# Patient Record
Sex: FEMALE | Race: WHITE | Employment: UNEMPLOYED | URBAN - METROPOLITAN AREA
[De-identification: names, ages, dates, MRNs, and addresses within clinical notes are randomized per-mention and may not be internally consistent; named-entity substitution may affect disease eponyms.]

---

## 2017-01-03 RX ORDER — ATORVASTATIN CALCIUM 10 MG/1
TABLET, FILM COATED ORAL
Qty: 90 TABLET | Refills: 2 | Status: SHIPPED | OUTPATIENT
Start: 2017-01-03 | End: 2017-12-20

## 2017-04-12 NOTE — TELEPHONE ENCOUNTER
Last Office Visit: 12-14-16 with CB for CPE   Last Rx Filled: 4-18-16 3 tubes with 1 refill   Last Labs: 12-6-16 cbc/cmp/lipid/tsh   Future Appointment: none     Per protocol to provider

## 2017-11-13 ENCOUNTER — TELEPHONE (OUTPATIENT)
Dept: INTERNAL MEDICINE CLINIC | Facility: CLINIC | Age: 62
End: 2017-11-13

## 2017-11-13 DIAGNOSIS — Z13.0 SCREENING FOR DISORDER OF BLOOD AND BLOOD-FORMING ORGANS: ICD-10-CM

## 2017-11-13 DIAGNOSIS — Z12.31 ENCOUNTER FOR SCREENING MAMMOGRAM FOR MALIGNANT NEOPLASM OF BREAST: Primary | ICD-10-CM

## 2017-11-13 DIAGNOSIS — Z00.00 ROUTINE GENERAL MEDICAL EXAMINATION AT A HEALTH CARE FACILITY: ICD-10-CM

## 2017-11-13 DIAGNOSIS — E78.00 HIGH CHOLESTEROL: ICD-10-CM

## 2017-11-13 DIAGNOSIS — K76.0 FATTY LIVER: ICD-10-CM

## 2017-11-13 NOTE — TELEPHONE ENCOUNTER
Pt will need mammo orders. No call back required will call CS in a few days to schedule.      CPE Future Appointments  Date Time Provider Maximino Loyola   12/20/2017 9:15 AM Saroj Garcias PA-C [facility]     Orders to Iliana Fuentes aware must f

## 2017-12-18 ENCOUNTER — LAB ENCOUNTER (OUTPATIENT)
Dept: LAB | Age: 62
End: 2017-12-18
Attending: PHYSICIAN ASSISTANT
Payer: OTHER GOVERNMENT

## 2017-12-18 DIAGNOSIS — Z00.00 ROUTINE GENERAL MEDICAL EXAMINATION AT A HEALTH CARE FACILITY: ICD-10-CM

## 2017-12-18 DIAGNOSIS — Z13.0 SCREENING FOR DISORDER OF BLOOD AND BLOOD-FORMING ORGANS: ICD-10-CM

## 2017-12-18 DIAGNOSIS — E78.00 HIGH CHOLESTEROL: ICD-10-CM

## 2017-12-18 PROCEDURE — 85025 COMPLETE CBC W/AUTO DIFF WBC: CPT

## 2017-12-18 PROCEDURE — 80053 COMPREHEN METABOLIC PANEL: CPT

## 2017-12-18 PROCEDURE — 80061 LIPID PANEL: CPT

## 2017-12-18 PROCEDURE — 36415 COLL VENOUS BLD VENIPUNCTURE: CPT

## 2017-12-18 PROCEDURE — 84443 ASSAY THYROID STIM HORMONE: CPT

## 2017-12-20 ENCOUNTER — OFFICE VISIT (OUTPATIENT)
Dept: INTERNAL MEDICINE CLINIC | Facility: CLINIC | Age: 62
End: 2017-12-20

## 2017-12-20 VITALS
BODY MASS INDEX: 40.43 KG/M2 | HEART RATE: 80 BPM | WEIGHT: 228.19 LBS | RESPIRATION RATE: 16 BRPM | TEMPERATURE: 98 F | SYSTOLIC BLOOD PRESSURE: 132 MMHG | HEIGHT: 63 IN | DIASTOLIC BLOOD PRESSURE: 86 MMHG

## 2017-12-20 DIAGNOSIS — Z12.39 BREAST CANCER SCREENING: ICD-10-CM

## 2017-12-20 DIAGNOSIS — Z87.898 HISTORY OF ABNORMAL MAMMOGRAM: ICD-10-CM

## 2017-12-20 DIAGNOSIS — L30.9 DERMATITIS: ICD-10-CM

## 2017-12-20 DIAGNOSIS — Z78.0 POST-MENOPAUSAL: ICD-10-CM

## 2017-12-20 DIAGNOSIS — R92.2 DENSE BREAST TISSUE ON MAMMOGRAM: ICD-10-CM

## 2017-12-20 DIAGNOSIS — Z00.00 ANNUAL PHYSICAL EXAM: Primary | ICD-10-CM

## 2017-12-20 PROCEDURE — 99396 PREV VISIT EST AGE 40-64: CPT | Performed by: PHYSICIAN ASSISTANT

## 2017-12-20 RX ORDER — ATORVASTATIN CALCIUM 10 MG/1
10 TABLET, FILM COATED ORAL
Qty: 90 TABLET | Refills: 3 | Status: SHIPPED | OUTPATIENT
Start: 2017-12-20 | End: 2019-01-27

## 2017-12-20 RX ORDER — CLOTRIMAZOLE 1 %
1 CREAM (GRAM) TOPICAL 2 TIMES DAILY
Qty: 14 G | Refills: 0 | Status: SHIPPED | OUTPATIENT
Start: 2017-12-20 | End: 2018-03-23

## 2017-12-20 NOTE — PROGRESS NOTES
Patient presents with:  Physical: Room 6. With pap. No flu       HPI:  Pt presents for annual physical.   Pt denies any new problems today. Last pap was 3 years ago, neg HPV. Pt has no h/o abnormal paps and in monogamous relationship.   Prefers to wait on lumbar      Past Medical History:   Diagnosis Date   • Acute upper respiratory infections of unspecified site    • Anxiety state, unspecified    • Closed fracture of unspecified part of femur      Past Surgical History:  No date:   2010: Gabriel Guajardo 0.625 MG/GM Vaginal Cream INSERT 0.5 GRAMS VAGINALLY THREE TIMES A WEEK Disp: 3 Tube Rfl: 2   Phytonadione (VITAMIN K OR) Take by mouth daily. Disp:  Rfl:    Aspirin 81 MG Oral Tab Take 81 mg by mouth daily.  Disp:  Rfl:    Cholecalciferol (VITAMIN D) 2000 Coordination normal.   Skin: Skin is warm and dry. + red papular rash under medial L breast  Psychiatric: Normal mood and affect.      [facility] Lab Encounter on 12/18/2017   Component Date Value Ref Range Status   • Glucose 12/18/2017 78  70 - 99 mg/dL Final   • 12/18/2017 2.48  1.30 - 6.70 x10(3) uL Final   • Lymphocyte Absolute 12/18/2017 2.19  0.90 - 4.00 x10(3) uL Final   • Monocyte Absolute 12/18/2017 0.48  0.10 - 0.60 x10(3) uL Final   • Eosinophil Absolute 12/18/2017 0.17  0.00 - 0.30 x10(3) uL Final   • Ba

## 2017-12-22 ENCOUNTER — HOSPITAL ENCOUNTER (OUTPATIENT)
Dept: MAMMOGRAPHY | Age: 62
Discharge: HOME | End: 2017-12-22
Attending: PHYSICIAN ASSISTANT
Payer: OTHER GOVERNMENT

## 2017-12-22 DIAGNOSIS — Z12.31 ENCOUNTER FOR SCREENING MAMMOGRAM FOR MALIGNANT NEOPLASM OF BREAST: ICD-10-CM

## 2017-12-22 DIAGNOSIS — Z12.39 ENCOUNTER FOR SCREENING FOR MALIGNANT NEOPLASM OF BREAST: ICD-10-CM

## 2017-12-22 PROCEDURE — 77063 BREAST TOMOSYNTHESIS BI: CPT | Performed by: PHYSICIAN ASSISTANT

## 2017-12-22 PROCEDURE — 77067 SCR MAMMO BI INCL CAD: CPT | Performed by: PHYSICIAN ASSISTANT

## 2018-01-02 ENCOUNTER — HOSPITAL ENCOUNTER (OUTPATIENT)
Dept: BONE DENSITY | Age: 63
Discharge: HOME | End: 2018-01-02
Attending: PHYSICIAN ASSISTANT
Payer: OTHER GOVERNMENT

## 2018-01-02 DIAGNOSIS — Z78.0 POST-MENOPAUSAL: ICD-10-CM

## 2018-01-02 PROCEDURE — 77080 DXA BONE DENSITY AXIAL: CPT | Performed by: PHYSICIAN ASSISTANT

## 2018-02-15 ENCOUNTER — OFFICE VISIT (OUTPATIENT)
Dept: INTERNAL MEDICINE CLINIC | Facility: CLINIC | Age: 63
End: 2018-02-15

## 2018-02-15 VITALS
BODY MASS INDEX: 38.07 KG/M2 | WEIGHT: 223 LBS | SYSTOLIC BLOOD PRESSURE: 132 MMHG | HEIGHT: 64 IN | HEART RATE: 88 BPM | RESPIRATION RATE: 16 BRPM | DIASTOLIC BLOOD PRESSURE: 80 MMHG | TEMPERATURE: 98 F

## 2018-02-15 DIAGNOSIS — N94.9 VAGINAL BURNING: ICD-10-CM

## 2018-02-15 DIAGNOSIS — B37.2 YEAST INFECTION OF THE SKIN: Primary | ICD-10-CM

## 2018-02-15 DIAGNOSIS — K64.4 EXTERNAL HEMORRHOID: ICD-10-CM

## 2018-02-15 LAB
APPEARANCE: CLEAR
BILIRUBIN: NEGATIVE
GLUCOSE (URINE DIPSTICK): NEGATIVE MG/DL
MULTISTIX LOT#: NORMAL NUMERIC
NITRITE, URINE: NEGATIVE
OCCULT BLOOD: NEGATIVE
PH, URINE: 7 (ref 4.5–8)
PROTEIN (URINE DIPSTICK): NEGATIVE MG/DL
SPECIFIC GRAVITY: 1.02 (ref 1–1.03)
URINE-COLOR: YELLOW
UROBILINOGEN,SEMI-QN: 0.2 MG/DL (ref 0–1.9)

## 2018-02-15 PROCEDURE — 81003 URINALYSIS AUTO W/O SCOPE: CPT | Performed by: PHYSICIAN ASSISTANT

## 2018-02-15 PROCEDURE — 99213 OFFICE O/P EST LOW 20 MIN: CPT | Performed by: PHYSICIAN ASSISTANT

## 2018-02-15 RX ORDER — FLUCONAZOLE 150 MG/1
150 TABLET ORAL ONCE
Qty: 1 TABLET | Refills: 0 | Status: SHIPPED | OUTPATIENT
Start: 2018-02-15 | End: 2019-01-10

## 2018-02-15 RX ORDER — CLOTRIMAZOLE AND BETAMETHASONE DIPROPIONATE 10; .64 MG/G; MG/G
CREAM TOPICAL
Qty: 15 G | Refills: 0 | Status: SHIPPED | OUTPATIENT
Start: 2018-02-15 | End: 2018-03-23

## 2018-02-15 NOTE — PROGRESS NOTES
Patient presents with:  Itching: AB RM 6, pt c/o hemorrhoids and swelling traveling to vagina X 2 weeks       HPI:  Pt presents with c/o a burning sensation all around her rectum and extending into her lower vaginal area.   She does have a h/o hemorrhoids a External Cream Apply 1 Application topically 2 (two) times daily.  Disp: 14 g Rfl: 0   Estrogens, Conjugated (PREMARIN) 0.625 MG/GM Vaginal Cream INSERT 0.5 GRAMS VAGINALLY THREE TIMES A WEEK Disp: 3 Tube Rfl: 2   Phytonadione (VITAMIN K OR) Take by mouth d answered and the patient understands the plan.

## 2018-02-19 ENCOUNTER — TELEPHONE (OUTPATIENT)
Dept: INTERNAL MEDICINE CLINIC | Facility: CLINIC | Age: 63
End: 2018-02-19

## 2018-02-19 NOTE — TELEPHONE ENCOUNTER
Patient states she has not had any recent abx use nor any blood in stool, abd pain, f/c.  Pt notified to continue with BRAT diet, hydration, could add low sugar gatorade for electrolyte replacement, notified if still having diarrhea or new s/s develop to c

## 2018-02-19 NOTE — TELEPHONE ENCOUNTER
Pt is calling to say she has been throwing up (lasted the first day, 3 times0  and now has turned to watery diarrhea since she was in to see CB on 2-15-18. Able to keep water down and can eat some things.  Some cereal, soup etc. Is there something she can b

## 2018-02-19 NOTE — TELEPHONE ENCOUNTER
Patient states she vomited 3 x's Saturday 12:30am which stopped mid am then loose brown watery stools, 4-5 x's since Saturday. Pt denies nausea/vomiting, fever, pain or recent travel.   Pt states she normally drinks a gallon of water a day and believes [facility]

## 2018-02-19 NOTE — TELEPHONE ENCOUNTER
Please verify no recent antibiotic use (I know about the Fluconazole I prescribed), if yes will need stool studies (C Dif, stool culture, O & P). If no then your recs are appropriate.   If she is still having diarrhea or any new sxs develop then I should s

## 2018-03-23 ENCOUNTER — APPOINTMENT (OUTPATIENT)
Dept: CT IMAGING | Facility: HOSPITAL | Age: 63
DRG: 670 | End: 2018-03-23
Attending: EMERGENCY MEDICINE
Payer: OTHER GOVERNMENT

## 2018-03-23 ENCOUNTER — HOSPITAL ENCOUNTER (INPATIENT)
Facility: HOSPITAL | Age: 63
LOS: 1 days | Discharge: HOME | DRG: 670 | End: 2018-03-24
Attending: EMERGENCY MEDICINE | Admitting: HOSPITALIST
Payer: OTHER GOVERNMENT

## 2018-03-23 DIAGNOSIS — N20.1 RIGHT URETERAL STONE: Primary | ICD-10-CM

## 2018-03-23 PROBLEM — R79.89 AZOTEMIA: Status: ACTIVE | Noted: 2018-03-23

## 2018-03-23 LAB
ALBUMIN SERPL-MCNC: 3.8 G/DL (ref 3.5–4.8)
ALP LIVER SERPL-CCNC: 99 U/L (ref 50–130)
ALT SERPL-CCNC: 25 U/L (ref 14–54)
AST SERPL-CCNC: 15 U/L (ref 15–41)
BASOPHILS # BLD AUTO: 0.03 X10(3) UL (ref 0–0.1)
BASOPHILS NFR BLD AUTO: 0.4 %
BILIRUB SERPL-MCNC: 0.5 MG/DL (ref 0.1–2)
BILIRUB UR QL STRIP.AUTO: NEGATIVE
BUN BLD-MCNC: 15 MG/DL (ref 8–20)
CALCIUM BLD-MCNC: 9.4 MG/DL (ref 8.3–10.3)
CHLORIDE: 109 MMOL/L (ref 101–111)
CO2: 25 MMOL/L (ref 22–32)
COLOR UR AUTO: YELLOW
CREAT BLD-MCNC: 0.74 MG/DL (ref 0.55–1.02)
EOSINOPHIL # BLD AUTO: 0.17 X10(3) UL (ref 0–0.3)
EOSINOPHIL NFR BLD AUTO: 2.3 %
ERYTHROCYTE [DISTWIDTH] IN BLOOD BY AUTOMATED COUNT: 12.2 % (ref 11.5–16)
GLUCOSE BLD-MCNC: 99 MG/DL (ref 70–99)
GLUCOSE UR STRIP.AUTO-MCNC: NEGATIVE MG/DL
HCT VFR BLD AUTO: 40.6 % (ref 34–50)
HGB BLD-MCNC: 13.5 G/DL (ref 12–16)
IMMATURE GRANULOCYTE COUNT: 0.01 X10(3) UL (ref 0–1)
IMMATURE GRANULOCYTE RATIO %: 0.1 %
KETONES UR STRIP.AUTO-MCNC: NEGATIVE MG/DL
LEUKOCYTE ESTERASE UR QL STRIP.AUTO: NEGATIVE
LIPASE: 261 U/L (ref 73–393)
LYMPHOCYTES # BLD AUTO: 1.78 X10(3) UL (ref 0.9–4)
LYMPHOCYTES NFR BLD AUTO: 24 %
M PROTEIN MFR SERPL ELPH: 6.9 G/DL (ref 6.1–8.3)
MCH RBC QN AUTO: 30.5 PG (ref 27–33.2)
MCHC RBC AUTO-ENTMCNC: 33.3 G/DL (ref 31–37)
MCV RBC AUTO: 91.9 FL (ref 81–100)
MONOCYTES # BLD AUTO: 0.6 X10(3) UL (ref 0.1–1)
MONOCYTES NFR BLD AUTO: 8.1 %
NEUTROPHIL ABS PRELIM: 4.82 X10 (3) UL (ref 1.3–6.7)
NEUTROPHILS # BLD AUTO: 4.82 X10(3) UL (ref 1.3–6.7)
NEUTROPHILS NFR BLD AUTO: 65.1 %
NITRITE UR QL STRIP.AUTO: NEGATIVE
PH UR STRIP.AUTO: 6 [PH] (ref 4.5–8)
PLATELET # BLD AUTO: 299 10(3)UL (ref 150–450)
POTASSIUM SERPL-SCNC: 3.8 MMOL/L (ref 3.6–5.1)
PROT UR STRIP.AUTO-MCNC: NEGATIVE MG/DL
RBC # BLD AUTO: 4.42 X10(6)UL (ref 3.8–5.1)
RBC #/AREA URNS AUTO: >10 /HPF
RED CELL DISTRIBUTION WIDTH-SD: 41.3 FL (ref 35.1–46.3)
SODIUM SERPL-SCNC: 142 MMOL/L (ref 136–144)
SP GR UR STRIP.AUTO: 1.02 (ref 1–1.03)
UROBILINOGEN UR STRIP.AUTO-MCNC: <2 MG/DL
WBC # BLD AUTO: 7.4 X10(3) UL (ref 4–13)

## 2018-03-23 PROCEDURE — 99223 1ST HOSP IP/OBS HIGH 75: CPT | Performed by: HOSPITALIST

## 2018-03-23 PROCEDURE — 74176 CT ABD & PELVIS W/O CONTRAST: CPT | Performed by: EMERGENCY MEDICINE

## 2018-03-23 RX ORDER — OMEGA-3 FATTY ACIDS/FISH OIL 300-1000MG
400 CAPSULE ORAL EVERY 6 HOURS PRN
COMMUNITY
End: 2019-04-03

## 2018-03-23 RX ORDER — MORPHINE SULFATE 4 MG/ML
1.5 INJECTION, SOLUTION INTRAMUSCULAR; INTRAVENOUS EVERY 2 HOUR PRN
Status: DISCONTINUED | OUTPATIENT
Start: 2018-03-23 | End: 2018-03-23

## 2018-03-23 RX ORDER — DIPHENHYDRAMINE HCL 25 MG
25 CAPSULE ORAL EVERY 6 HOURS PRN
Status: DISCONTINUED | OUTPATIENT
Start: 2018-03-23 | End: 2018-03-23

## 2018-03-23 RX ORDER — ACETAMINOPHEN 325 MG/1
650 TABLET ORAL EVERY 6 HOURS PRN
Status: DISCONTINUED | OUTPATIENT
Start: 2018-03-23 | End: 2018-03-24

## 2018-03-23 RX ORDER — LEVOFLOXACIN 5 MG/ML
750 INJECTION, SOLUTION INTRAVENOUS ONCE
Status: COMPLETED | OUTPATIENT
Start: 2018-03-23 | End: 2018-03-23

## 2018-03-23 RX ORDER — MORPHINE SULFATE 4 MG/ML
1 INJECTION, SOLUTION INTRAMUSCULAR; INTRAVENOUS EVERY 2 HOUR PRN
Status: DISCONTINUED | OUTPATIENT
Start: 2018-03-23 | End: 2018-03-23

## 2018-03-23 RX ORDER — HYDROMORPHONE HYDROCHLORIDE 2 MG/1
1 TABLET ORAL
Status: DISCONTINUED | OUTPATIENT
Start: 2018-03-23 | End: 2018-03-24

## 2018-03-23 RX ORDER — SODIUM CHLORIDE 9 MG/ML
INJECTION, SOLUTION INTRAVENOUS CONTINUOUS
Status: ACTIVE | OUTPATIENT
Start: 2018-03-23 | End: 2018-03-23

## 2018-03-23 RX ORDER — POTASSIUM CHLORIDE 20 MEQ/1
40 TABLET, EXTENDED RELEASE ORAL ONCE
Status: COMPLETED | OUTPATIENT
Start: 2018-03-23 | End: 2018-03-23

## 2018-03-23 RX ORDER — KETOROLAC TROMETHAMINE 30 MG/ML
30 INJECTION, SOLUTION INTRAMUSCULAR; INTRAVENOUS ONCE
Status: COMPLETED | OUTPATIENT
Start: 2018-03-23 | End: 2018-03-23

## 2018-03-23 RX ORDER — ONDANSETRON 2 MG/ML
4 INJECTION INTRAMUSCULAR; INTRAVENOUS EVERY 4 HOURS PRN
Status: DISCONTINUED | OUTPATIENT
Start: 2018-03-23 | End: 2018-03-23

## 2018-03-23 RX ORDER — MORPHINE SULFATE 4 MG/ML
1 INJECTION, SOLUTION INTRAMUSCULAR; INTRAVENOUS EVERY 2 HOUR PRN
Status: DISCONTINUED | OUTPATIENT
Start: 2018-03-23 | End: 2018-03-24

## 2018-03-23 RX ORDER — MORPHINE SULFATE 4 MG/ML
4 INJECTION, SOLUTION INTRAMUSCULAR; INTRAVENOUS EVERY 2 HOUR PRN
Status: DISCONTINUED | OUTPATIENT
Start: 2018-03-23 | End: 2018-03-24

## 2018-03-23 RX ORDER — SODIUM CHLORIDE 9 MG/ML
INJECTION, SOLUTION INTRAVENOUS CONTINUOUS
Status: DISCONTINUED | OUTPATIENT
Start: 2018-03-23 | End: 2018-03-24

## 2018-03-23 RX ORDER — MORPHINE SULFATE 4 MG/ML
2 INJECTION, SOLUTION INTRAMUSCULAR; INTRAVENOUS EVERY 2 HOUR PRN
Status: DISCONTINUED | OUTPATIENT
Start: 2018-03-23 | End: 2018-03-24

## 2018-03-23 RX ORDER — MORPHINE SULFATE 4 MG/ML
2 INJECTION, SOLUTION INTRAMUSCULAR; INTRAVENOUS EVERY 2 HOUR PRN
Status: DISCONTINUED | OUTPATIENT
Start: 2018-03-23 | End: 2018-03-23

## 2018-03-23 RX ORDER — KETOROLAC TROMETHAMINE 30 MG/ML
INJECTION, SOLUTION INTRAMUSCULAR; INTRAVENOUS
Status: COMPLETED
Start: 2018-03-23 | End: 2018-03-23

## 2018-03-23 RX ORDER — ONDANSETRON 2 MG/ML
4 INJECTION INTRAMUSCULAR; INTRAVENOUS EVERY 6 HOURS PRN
Status: DISCONTINUED | OUTPATIENT
Start: 2018-03-23 | End: 2018-03-24

## 2018-03-23 RX ORDER — DIPHENHYDRAMINE HCL 25 MG
25 CAPSULE ORAL NIGHTLY PRN
Status: DISCONTINUED | OUTPATIENT
Start: 2018-03-23 | End: 2018-03-24

## 2018-03-23 NOTE — ED INITIAL ASSESSMENT (HPI)
Right lower abdominal pain, radiating to the right lower back. Reports pain is of a sudden onset. Denies fevers. Denies nausea, vomiting.  History of kidney stone \"many years ago,\" which required surgical removal.

## 2018-03-24 ENCOUNTER — SURGERY (OUTPATIENT)
Age: 63
End: 2018-03-24

## 2018-03-24 ENCOUNTER — APPOINTMENT (OUTPATIENT)
Dept: GENERAL RADIOLOGY | Facility: HOSPITAL | Age: 63
DRG: 670 | End: 2018-03-24
Attending: UROLOGY
Payer: OTHER GOVERNMENT

## 2018-03-24 ENCOUNTER — ANESTHESIA (OUTPATIENT)
Dept: SURGERY | Facility: HOSPITAL | Age: 63
DRG: 670 | End: 2018-03-24
Payer: OTHER GOVERNMENT

## 2018-03-24 ENCOUNTER — ANESTHESIA EVENT (OUTPATIENT)
Dept: SURGERY | Facility: HOSPITAL | Age: 63
DRG: 670 | End: 2018-03-24
Payer: OTHER GOVERNMENT

## 2018-03-24 VITALS
SYSTOLIC BLOOD PRESSURE: 131 MMHG | TEMPERATURE: 98 F | BODY MASS INDEX: 35.85 KG/M2 | OXYGEN SATURATION: 95 % | WEIGHT: 210 LBS | DIASTOLIC BLOOD PRESSURE: 72 MMHG | RESPIRATION RATE: 16 BRPM | HEIGHT: 64 IN | HEART RATE: 81 BPM

## 2018-03-24 LAB — POTASSIUM SERPL-SCNC: 4.1 MMOL/L (ref 3.6–5.1)

## 2018-03-24 PROCEDURE — 99238 HOSP IP/OBS DSCHRG MGMT 30/<: CPT | Performed by: HOSPITALIST

## 2018-03-24 PROCEDURE — BT1D1ZZ FLUOROSCOPY OF RIGHT KIDNEY, URETER AND BLADDER USING LOW OSMOLAR CONTRAST: ICD-10-PCS | Performed by: UROLOGY

## 2018-03-24 PROCEDURE — 0T768DZ DILATION OF RIGHT URETER WITH INTRALUMINAL DEVICE, VIA NATURAL OR ARTIFICIAL OPENING ENDOSCOPIC: ICD-10-PCS | Performed by: UROLOGY

## 2018-03-24 PROCEDURE — 74420 UROGRAPHY RTRGR +-KUB: CPT | Performed by: UROLOGY

## 2018-03-24 PROCEDURE — 0TC68ZZ EXTIRPATION OF MATTER FROM RIGHT URETER, VIA NATURAL OR ARTIFICIAL OPENING ENDOSCOPIC: ICD-10-PCS | Performed by: UROLOGY

## 2018-03-24 DEVICE — STENT URET 6F 26CM WO GW INL: Type: IMPLANTABLE DEVICE | Site: URETER | Status: FUNCTIONAL

## 2018-03-24 RX ORDER — ACETAMINOPHEN AND CODEINE PHOSPHATE 300; 30 MG/1; MG/1
1 TABLET ORAL EVERY 4 HOURS PRN
Status: DISCONTINUED | OUTPATIENT
Start: 2018-03-24 | End: 2018-03-24

## 2018-03-24 RX ORDER — SODIUM CHLORIDE, SODIUM LACTATE, POTASSIUM CHLORIDE, CALCIUM CHLORIDE 600; 310; 30; 20 MG/100ML; MG/100ML; MG/100ML; MG/100ML
INJECTION, SOLUTION INTRAVENOUS CONTINUOUS
Status: DISCONTINUED | OUTPATIENT
Start: 2018-03-24 | End: 2018-03-24 | Stop reason: HOSPADM

## 2018-03-24 RX ORDER — ALFUZOSIN HYDROCHLORIDE 10 MG/1
10 TABLET, EXTENDED RELEASE ORAL
Qty: 5 TABLET | Refills: 0 | Status: SHIPPED | OUTPATIENT
Start: 2018-03-24 | End: 2018-04-12 | Stop reason: ALTCHOICE

## 2018-03-24 RX ORDER — ACETAMINOPHEN AND CODEINE PHOSPHATE 300; 30 MG/1; MG/1
2 TABLET ORAL EVERY 4 HOURS PRN
Status: DISCONTINUED | OUTPATIENT
Start: 2018-03-24 | End: 2018-03-24

## 2018-03-24 RX ORDER — ACETAMINOPHEN AND CODEINE PHOSPHATE 300; 30 MG/1; MG/1
1-2 TABLET ORAL EVERY 4 HOURS PRN
Qty: 15 TABLET | Refills: 0 | Status: SHIPPED | OUTPATIENT
Start: 2018-03-24 | End: 2019-01-04 | Stop reason: ALTCHOICE

## 2018-03-24 RX ORDER — NALOXONE HYDROCHLORIDE 0.4 MG/ML
80 INJECTION, SOLUTION INTRAMUSCULAR; INTRAVENOUS; SUBCUTANEOUS AS NEEDED
Status: DISCONTINUED | OUTPATIENT
Start: 2018-03-24 | End: 2018-03-24 | Stop reason: HOSPADM

## 2018-03-24 RX ORDER — IBUPROFEN 200 MG
400 TABLET ORAL EVERY 6 HOURS PRN
Status: DISCONTINUED | OUTPATIENT
Start: 2018-03-24 | End: 2018-03-24

## 2018-03-24 RX ORDER — ALFUZOSIN HYDROCHLORIDE 10 MG/1
10 TABLET, EXTENDED RELEASE ORAL
Status: DISCONTINUED | OUTPATIENT
Start: 2018-03-24 | End: 2018-03-24

## 2018-03-24 RX ORDER — SULFAMETHOXAZOLE AND TRIMETHOPRIM 800; 160 MG/1; MG/1
1 TABLET ORAL 2 TIMES DAILY
Qty: 4 TABLET | Refills: 0 | Status: SHIPPED | OUTPATIENT
Start: 2018-03-24 | End: 2018-03-26

## 2018-03-24 RX ORDER — ONDANSETRON 2 MG/ML
4 INJECTION INTRAMUSCULAR; INTRAVENOUS AS NEEDED
Status: DISCONTINUED | OUTPATIENT
Start: 2018-03-24 | End: 2018-03-24 | Stop reason: HOSPADM

## 2018-03-24 NOTE — PLAN OF CARE
Patient continues to deny pain. Voiding without difficulty, urine hazy straw colored. Urine strained without sediment noted. Will continue to monitor patient.

## 2018-03-24 NOTE — PLAN OF CARE
Pt taken to OR via bed/transport in stable condition. Spouse accompanied pt. Will continue to monitor post-op.

## 2018-03-24 NOTE — ED PROVIDER NOTES
Patient Seen in: [facility] Emergency Department    History   Patient presents with:  Abdomen/Flank Pain (GI/)    Stated Complaint: abdominal pain radiating to back/hx of kidney stones    HPI    There is a pleasant 27-year-old female presents to t Systems    Positive for stated complaint: abdominal pain radiating to back/hx of kidney stones  Other systems are as noted in HPI. Constitutional and vital signs reviewed. All other systems reviewed and negative except as noted above.     Physical Exa Urine 4+ (*)     All other components within normal limits   COMP METABOLIC PANEL (14) - Normal   LIPASE - Normal   CBC WITH DIFFERENTIAL WITH PLATELET    Narrative: The following orders were created for panel order CBC WITH DIFFERENTIAL WITH PLATELET. specified.       Medications Prescribed:  Current Discharge Medication List        Present on Admission  Date Reviewed: 12/20/2017          ICD-10-CM Noted POA    Azotemia R79.89 3/23/2018 Yes    Right ureteral stone N20.1 3/23/2018 Unknown

## 2018-03-24 NOTE — PLAN OF CARE
NURSING DISCHARGE NOTE    Discharged Home via Ambulatory. Accompanied by RN  Belongings Taken by patient/family. Pt reports no further leakage of urine after stent advanced. Pt voided another 350 ml of pink-tinged urine.  IV x2 removed, catheter tip

## 2018-03-24 NOTE — ANESTHESIA PREPROCEDURE EVALUATION
PRE-OP EVALUATION    Patient Name: Indio Low    Pre-op Diagnosis: Right ureteral stone [N20.1]    Procedure(s):  CYSTOSCOPY URETEROSCOPY, RETROGRADE, PYELOGRAM, STONE MANIPULATION AND REMOVAL WITH HOLIUM LASER AND LYTOTRYPSY WITH INSERTION RIGHT U Cholecalciferol (VITAMIN D) 2000 UNITS Oral Tab Take 1 Tab by mouth daily. Disp:  Rfl:        Allergies: Ceclor      Anesthesia Evaluation    Patient summary reviewed.     Anesthetic Complications           GI/Hepatic/Renal  Comment: Fatty liver  Right magda MCHC 33.3 03/23/2018   RDW 12.2 03/23/2018   .0 03/23/2018       Lab Results  Component Value Date    03/23/2018   K 4.1 03/24/2018    03/23/2018   CO2 25.0 03/23/2018   BUN 15 03/23/2018   CREATSERUM 0.74 03/23/2018   GLU 99 03/23/2018

## 2018-03-24 NOTE — PROGRESS NOTES
[facility] Pharmacy Note: Antimicrobial Dose Adjustment for: levofloxacin (Jcarlos Jimenez)    Johnathon Cruz is a 65year old female who has been prescribed levofloxacin (LEVAQUIN) 500 mg IVPB x 1 dose.   CrCl is estimated creatinine clearance is 68.9 mL/min (based

## 2018-03-24 NOTE — PROGRESS NOTES
Patient seen and examined. Medically clear to discharge today. Had Laser litho and stent placement. Doing well after. Pain is minimal currently.  Ok to DC later today    July Junior MD

## 2018-03-24 NOTE — PLAN OF CARE
GENITOURINARY - ADULT    • Absence of urinary retention Completed          PAIN - ADULT    • Verbalizes/displays adequate comfort level or patient's stated pain goal Progressing          Patient admitted via cart at 2210. Oriented to room.    Safety precau

## 2018-03-24 NOTE — PLAN OF CARE
Upon reassessment, pt continues to have minimal pain and not needing narcotics. Pt tolerated general diet without nausea. Pt ambulated in halls without difficulty. Pt voided 1000ml of blood-tinged urine and reports, \"some burning\".  Pt reports constant

## 2018-03-24 NOTE — CONSULTS
[facility]  Report of Consultation    Brenda Slider Patient Status:  Inpatient    12/10/1955 MRN [id number]   [facility] 3NW-A Attending Patrecia Cooks, MD   Hosp Day # 1 PCP Tien Lim MD     Impression and Plan:  Right ur morphINE sulfate (PF) 4 MG/ML injection 1 mg 1 mg Intravenous Q2H PRN   Or      [MAR Hold] morphINE sulfate (PF) 4 MG/ML injection 2 mg 2 mg Intravenous Q2H PRN   Or      [MAR Hold] morphINE sulfate (PF) 4 MG/ML injection 4 mg 4 mg Intravenous Q2H PRN   [M 5/7/1975    Smokeless tobacco: Never Used    Alcohol use Yes  1.2 oz/week    2 Glasses of wine per week         Comment: soc     Drug use: No    Sexual activity: Not Currently     Other Topics Concern    Caffeine Concern Yes    Comment: 1 cup coffee daily

## 2018-03-24 NOTE — ANESTHESIA POSTPROCEDURE EVALUATION
[facility] Patient Status:  Inpatient   Age/Gender 65year old female MRN [id number]   Location [address] [facility] Attending Sukh Rooney MD   Hosp Day # 1 PCP Ligia Dela Cruz MD       Anesthesia Post-op

## 2018-03-24 NOTE — H&P
[facility]  History and Physical     Neetumonie Louis Patient Status:  Emergency    12/10/1955 MRN [id number]   [facility] [address] [facility] [address] Attending Amber Chacon MD   Hosp Day # 0 PCP Yuliet Tavarez MD     Chief Compla does not use drugs.     Family History:   Family History   Problem Relation Age of Onset   • Cancer Father    • bunions Cassandra Dys Father    • Alzheimer's Cassandra Dys Mother        Allergies:   Ceclor                  Hives    Medications:    No current facility-a 1848   GLU  99   BUN  15   CREATSERUM  0.74   GFRAA  101   GFRNAA  88   CA  9.4   ALB  3.8   NA  142   K  3.8   CL  109   CO2  25.0   ALKPHO  99   AST  15   ALT  25   BILT  0.5   TP  6.9       Estimated Creatinine Clearance: 68.9 mL/min (based on SCr of 0.

## 2018-03-24 NOTE — OPERATIVE REPORT
[facility] Patient Status:  Inpatient    12/10/1955 MRN [id number]   Location [address] [facility] Attending Sarah Ernst MD   Hosp Day # 1 PCP Mimi Umanzor MD     DATE OF OPERATION fragments. A 6-Solomon Islander 26-cm  stent was then passed over a guidewire up into the ureter. The  stent was in good position when the guidewire was removed and the  dangler was taped to the patient's lower abdomen.   The patient was  awakened, and taken to the

## 2018-03-26 NOTE — DISCHARGE SUMMARY
[facility]  DISCHARGE SUMMARY     Manohar Zapata Patient Status:  Inpatient    12/10/1955 MRN [id number]   [facility] 3NW-A Attending No att. providers found   Hosp Day # 1 PCP Braxton Barbosa MD     Date of Admission: 3/23/2018 Discharge Medications      START taking these medications      Instructions Prescription details   Acetaminophen-Codeine #3 300-30 MG Tabs  Commonly known as:  TYLENOL #3  Notes to patient:  Do not take while driving/operating heavy machinery.   Do not ov Kathleen Sofia MD  [id number] [place] [address] [phone]    In 4 days  Dangler stent removal    Helder Peng MD  [address] [address]  [phone]    Schedule an appointment as soon as possible for a

## 2018-03-27 ENCOUNTER — PATIENT OUTREACH (OUTPATIENT)
Dept: CASE MANAGEMENT | Age: 63
End: 2018-03-27

## 2018-03-27 ENCOUNTER — TELEPHONE (OUTPATIENT)
Dept: INTERNAL MEDICINE CLINIC | Facility: CLINIC | Age: 63
End: 2018-03-27

## 2018-03-27 NOTE — TELEPHONE ENCOUNTER
Spoke to pt for hospital follow-up today. Pt does not have HFU appt scheduled at this time. HFU appt recommended by 4/7/18 as pt is moderate risk for readmission. Please advise.      TRIAGE:  Please f/u with pt and try to get her to schedule as she would

## 2018-03-27 NOTE — PROGRESS NOTES
Initial Post Discharge Follow Up   Discharge Date: 3/24/18  Contact Date: 3/27/2018    Consent Verification:  Assessment Completed With: Patient  HIPAA Verified?   Yes    Discharge Dx:  Right ureteral stone, s/p cystoscopy with lithotripsy and stent plac

## 2018-03-29 LAB
CALCULI MASS: 34 MG
CALCULI NUMBER: 2

## 2018-03-30 NOTE — TELEPHONE ENCOUNTER
Called pt. To schedule hospital FU pt. Refused to schedule appt. Stating has appointment with Urologist Dr. Merlos on 4/12/18 and will see him first, pt.  Stating she is feeling much better and will call back once she follows up with Dr. Merlos and call back to

## 2018-04-02 PROCEDURE — 87086 URINE CULTURE/COLONY COUNT: CPT | Performed by: UROLOGY

## 2018-10-25 ENCOUNTER — TELEPHONE (OUTPATIENT)
Dept: INTERNAL MEDICINE CLINIC | Facility: CLINIC | Age: 63
End: 2018-10-25

## 2018-10-25 DIAGNOSIS — Z00.00 ROUTINE GENERAL MEDICAL EXAMINATION AT A HEALTH CARE FACILITY: Primary | ICD-10-CM

## 2018-10-25 DIAGNOSIS — Z12.31 ENCOUNTER FOR SCREENING MAMMOGRAM FOR MALIGNANT NEOPLASM OF BREAST: Primary | ICD-10-CM

## 2018-10-25 NOTE — TELEPHONE ENCOUNTER
Future Appointments   Date Time Provider Maximino Loyola   1/2/2019 10:15 AM Elizabeth Marin PA-C [facility]     Patient is having an annual physical with CB 1/2/19. Patient would like to get her labs and mammogram done at the same time.

## 2018-10-25 NOTE — TELEPHONE ENCOUNTER
Future Appointments   Date Time Provider Maximino Milla   1/2/2019 10:15 AM Alida Campos PA-C [facility]     Patient having annual physical with CB. Please place orders with Ana Mosher Dr. Patient will be fasting.

## 2018-12-24 NOTE — TELEPHONE ENCOUNTER
CPE labs ordered on 12/12/18    Notes Recorded by Rowan Aleman MD on 12/22/2017 at 11:54 AM CST  WNL; Repeat one year. Mammogram ordered per protocol as requested.

## 2018-12-28 ENCOUNTER — LABORATORY ENCOUNTER (OUTPATIENT)
Dept: LAB | Age: 63
End: 2018-12-28
Attending: PHYSICIAN ASSISTANT
Payer: OTHER GOVERNMENT

## 2018-12-28 ENCOUNTER — HOSPITAL ENCOUNTER (OUTPATIENT)
Dept: MAMMOGRAPHY | Facility: HOSPITAL | Age: 63
Discharge: HOME | End: 2018-12-28
Attending: PHYSICIAN ASSISTANT
Payer: OTHER GOVERNMENT

## 2018-12-28 DIAGNOSIS — R73.9 HYPERGLYCEMIA: Primary | ICD-10-CM

## 2018-12-28 DIAGNOSIS — Z00.00 ROUTINE GENERAL MEDICAL EXAMINATION AT A HEALTH CARE FACILITY: ICD-10-CM

## 2018-12-28 DIAGNOSIS — Z12.31 ENCOUNTER FOR SCREENING MAMMOGRAM FOR MALIGNANT NEOPLASM OF BREAST: ICD-10-CM

## 2018-12-28 DIAGNOSIS — R73.9 HYPERGLYCEMIA: ICD-10-CM

## 2018-12-28 PROCEDURE — 36415 COLL VENOUS BLD VENIPUNCTURE: CPT | Performed by: PHYSICIAN ASSISTANT

## 2018-12-28 PROCEDURE — 80050 GENERAL HEALTH PANEL: CPT | Performed by: PHYSICIAN ASSISTANT

## 2018-12-28 PROCEDURE — 83036 HEMOGLOBIN GLYCOSYLATED A1C: CPT | Performed by: PHYSICIAN ASSISTANT

## 2018-12-28 PROCEDURE — 77063 BREAST TOMOSYNTHESIS BI: CPT | Performed by: PHYSICIAN ASSISTANT

## 2018-12-28 PROCEDURE — 77067 SCR MAMMO BI INCL CAD: CPT | Performed by: PHYSICIAN ASSISTANT

## 2018-12-28 PROCEDURE — 80061 LIPID PANEL: CPT | Performed by: PHYSICIAN ASSISTANT

## 2019-01-10 ENCOUNTER — OFFICE VISIT (OUTPATIENT)
Dept: INTERNAL MEDICINE CLINIC | Facility: CLINIC | Age: 64
End: 2019-01-10
Payer: OTHER GOVERNMENT

## 2019-01-10 VITALS
SYSTOLIC BLOOD PRESSURE: 128 MMHG | WEIGHT: 228 LBS | TEMPERATURE: 98 F | HEART RATE: 88 BPM | RESPIRATION RATE: 16 BRPM | HEIGHT: 63 IN | DIASTOLIC BLOOD PRESSURE: 74 MMHG | BODY MASS INDEX: 40.4 KG/M2

## 2019-01-10 DIAGNOSIS — Z11.51 SCREENING FOR HPV (HUMAN PAPILLOMAVIRUS): ICD-10-CM

## 2019-01-10 DIAGNOSIS — K64.4 EXTERNAL HEMORRHOID: ICD-10-CM

## 2019-01-10 DIAGNOSIS — Z12.4 CERVICAL CANCER SCREENING: ICD-10-CM

## 2019-01-10 DIAGNOSIS — N94.9 VAGINAL BURNING: ICD-10-CM

## 2019-01-10 DIAGNOSIS — E78.00 HIGH CHOLESTEROL: ICD-10-CM

## 2019-01-10 DIAGNOSIS — Z00.00 ROUTINE GENERAL MEDICAL EXAMINATION AT A HEALTH CARE FACILITY: Primary | ICD-10-CM

## 2019-01-10 DIAGNOSIS — E66.01 CLASS 3 SEVERE OBESITY WITHOUT SERIOUS COMORBIDITY WITH BODY MASS INDEX (BMI) OF 40.0 TO 44.9 IN ADULT, UNSPECIFIED OBESITY TYPE (HCC): ICD-10-CM

## 2019-01-10 DIAGNOSIS — R73.03 PRE-DIABETES: ICD-10-CM

## 2019-01-10 PROCEDURE — 82340 ASSAY OF CALCIUM IN URINE: CPT | Performed by: OBSTETRICS & GYNECOLOGY

## 2019-01-10 PROCEDURE — 99396 PREV VISIT EST AGE 40-64: CPT | Performed by: PHYSICIAN ASSISTANT

## 2019-01-10 PROCEDURE — 87624 HPV HI-RISK TYP POOLED RSLT: CPT | Performed by: PHYSICIAN ASSISTANT

## 2019-01-10 PROCEDURE — 82507 ASSAY OF CITRATE: CPT | Performed by: OBSTETRICS & GYNECOLOGY

## 2019-01-10 PROCEDURE — 82436 ASSAY OF URINE CHLORIDE: CPT | Performed by: OBSTETRICS & GYNECOLOGY

## 2019-01-10 PROCEDURE — 84392 ASSAY OF URINE SULFATE: CPT | Performed by: OBSTETRICS & GYNECOLOGY

## 2019-01-10 PROCEDURE — 83945 ASSAY OF OXALATE: CPT | Performed by: OBSTETRICS & GYNECOLOGY

## 2019-01-10 PROCEDURE — 88175 CYTOPATH C/V AUTO FLUID REDO: CPT | Performed by: PHYSICIAN ASSISTANT

## 2019-01-10 RX ORDER — CLOTRIMAZOLE AND BETAMETHASONE DIPROPIONATE 10; .64 MG/G; MG/G
CREAM TOPICAL
Qty: 15 G | Refills: 0 | Status: SHIPPED | OUTPATIENT
Start: 2019-01-10

## 2019-01-10 RX ORDER — FLUCONAZOLE 150 MG/1
150 TABLET ORAL ONCE
Qty: 1 TABLET | Refills: 0 | Status: SHIPPED | OUTPATIENT
Start: 2019-01-10 | End: 2019-01-10

## 2019-01-10 RX ORDER — MAGNESIUM OXIDE/MAG AA CHELATE 300 MG
CAPSULE ORAL
COMMUNITY

## 2019-01-10 RX ORDER — ALCLOMETASONE DIPROPIONATE 0.5 MG/G
CREAM TOPICAL
COMMUNITY
Start: 2018-11-29 | End: 2020-02-06 | Stop reason: ALTCHOICE

## 2019-01-10 NOTE — PROGRESS NOTES
Patient presents with:  Physical: cn room 6: patient is here for a physical today       HPI:   Pt presents for annual physical.  Her only c/o today is vaginal and perineal irriation, much like she had last Feb.  Admits she was sick for 2 weeks at [place] of both kidneys     Lumbar radiculopathy     HNP (herniated nucleus pulposus), lumbar     Azotemia     Right ureteral stone      Past Medical History:   Diagnosis Date   • Acute upper respiratory infections of unspecified site    • Anxiety state, unspecifi Medications:  Probiotic Product (PROBIOTIC-10 OR) Take by mouth. Disp:  Rfl:    Magnesium 300 MG Oral Cap Take by mouth. Disp:  Rfl:    Pyridoxine HCl (VITAMIN B-6 OR) Take by mouth. Disp:  Rfl:    Menaquinone-7 (VITAMIN K2 OR) Take by mouth.  Disp:  Rfl: Zoster Vaccine Recombinant Adjuvanted (Shingrix)                          09/20/2018    Dental Visits:  Yes  Colonoscopy:  UTD  Pap:  Today  Mammogram:  UTD  Bone Density: UTD    Physical Exam  /74 (BP Location: Right arm, Patient Position: Sitting, 01/02/2019 1.025  1.005 - 1.030 Final   • OCCULT BLOOD 01/02/2019 neg  Negative Final   • PH, URINE 01/02/2019 6.0  4.5 - 8.0 Final   • PROTEIN (URINE DIPSTICK) 01/02/2019 neg  Negative/Trace mg/dL Final   • UROBILINOGEN,SEMI-QN 01/02/2019 0.2  0.0 - 1.9 m g/dL Final   • A/G Ratio 12/28/2018 1.1  1.0 - 2.0 Final   • WBC 12/28/2018 5.6  4.0 - 13.0 x10(3) uL Final   • RBC 12/28/2018 4.59  3.80 - 5.10 x10(6)uL Final   • HGB 12/28/2018 14.4  12.0 - 16.0 g/dL Final   • HCT 12/28/2018 41.9  34.0 - 50.0 % Final   • importance of diet, exercise and wt loss. Pt will look into [facility] (check on ins coverage).     Cervical cancer screening  Screening for hpv (human papillomavirus)    Orders Placed This Encounter      Hpv Dna  High Risk , Thin Prep Collect      ThinPrep PAP S

## 2019-01-11 LAB — HPV I/H RISK 1 DNA SPEC QL NAA+PROBE: NEGATIVE

## 2019-01-28 RX ORDER — ATORVASTATIN CALCIUM 10 MG/1
TABLET, FILM COATED ORAL
Qty: 90 TABLET | Refills: 1 | Status: SHIPPED | OUTPATIENT
Start: 2019-01-28 | End: 2019-07-27

## 2019-04-02 NOTE — PROGRESS NOTES
HISTORY OF PRESENT ILLNESS  Patient presents with:  Weight Problem: Reffered by Bon Naik is a 59year old female new to our office today for initiation of medical weight loss program.  Patient presents today with c/o excess brendan Family or personal history of Pancreatic issues / Medullary Thyroid Cancer: negative  History of bariatric surgery: negative    [address] reviewed: obesity in parent/s or sibling: yes, [de-identified] of family.     REVIEW OF SYSTEMS  GENERAL: feels well otherwise  SK 12/18/2017    GFRNAA 94 12/28/2018    GFRAA 108 12/28/2018    CA 9.0 12/28/2018    OSMOCALC 291 12/28/2018    ALKPHO 106 12/28/2018    AST 19 12/28/2018    ALT 25 12/28/2018    BILT 0.9 12/28/2018    TP 7.0 12/28/2018    ALB 3.6 12/28/2018    GLOBULIN 3.4 file prior to visit.      ASSESSMENT  Initial Weight Data and Goal Weight Loss:       Diagnoses and all orders for this visit:    Encounter for therapeutic drug monitoring  - reviewed PMH in EMR  -     LEPTIN, SERUM; Future  -     VITAMIN B12; Future  - days minimum). See journal options below. 2.  Complete fasting (8 hours, water only) labs at [facility] lab site prior to next office visit.   3.  Fill your prescribed medication and take as discussed and prescribed:  Start Metformin ER 1 tab daily with meal f time commitment. This Umesh can also help work on behavior change and improve sleep. Check out www.[email or web address] org blog for continued daily support and education along this weight loss journey!                 Return in about 1 month (around 5/3/2019

## 2019-04-03 ENCOUNTER — OFFICE VISIT (OUTPATIENT)
Dept: INTERNAL MEDICINE CLINIC | Facility: CLINIC | Age: 64
End: 2019-04-03
Payer: OTHER GOVERNMENT

## 2019-04-03 VITALS
RESPIRATION RATE: 14 BRPM | DIASTOLIC BLOOD PRESSURE: 74 MMHG | HEIGHT: 62.5 IN | HEART RATE: 70 BPM | WEIGHT: 225 LBS | SYSTOLIC BLOOD PRESSURE: 126 MMHG | BODY MASS INDEX: 40.37 KG/M2

## 2019-04-03 DIAGNOSIS — E78.00 HIGH CHOLESTEROL: ICD-10-CM

## 2019-04-03 DIAGNOSIS — R73.03 PRE-DIABETES: ICD-10-CM

## 2019-04-03 DIAGNOSIS — E66.01 MORBID OBESITY WITH BMI OF 40.0-44.9, ADULT (HCC): ICD-10-CM

## 2019-04-03 DIAGNOSIS — Z51.81 ENCOUNTER FOR THERAPEUTIC DRUG MONITORING: Primary | ICD-10-CM

## 2019-04-03 PROCEDURE — 99214 OFFICE O/P EST MOD 30 MIN: CPT | Performed by: NURSE PRACTITIONER

## 2019-04-03 RX ORDER — METFORMIN HYDROCHLORIDE 750 MG/1
1500 TABLET, EXTENDED RELEASE ORAL
Qty: 60 TABLET | Refills: 5 | Status: SHIPPED | OUTPATIENT
Start: 2019-04-03 | End: 2019-06-03

## 2019-04-10 ENCOUNTER — OFFICE VISIT (OUTPATIENT)
Dept: INTERNAL MEDICINE CLINIC | Facility: CLINIC | Age: 64
End: 2019-04-10
Payer: OTHER GOVERNMENT

## 2019-04-10 DIAGNOSIS — E66.01 OBESITY, CLASS III, BMI 40-49.9 (MORBID OBESITY) (HCC): Primary | ICD-10-CM

## 2019-04-10 DIAGNOSIS — R73.03 PRE-DIABETES: ICD-10-CM

## 2019-04-10 DIAGNOSIS — K76.0 FATTY LIVER: ICD-10-CM

## 2019-04-10 DIAGNOSIS — E78.00 HIGH CHOLESTEROL: ICD-10-CM

## 2019-04-10 PROCEDURE — 97802 MEDICAL NUTRITION INDIV IN: CPT | Performed by: DIETITIAN, REGISTERED

## 2019-04-15 NOTE — PROGRESS NOTES
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   Plans on adding strength training    Food J

## 2019-04-25 ENCOUNTER — HOSPITAL ENCOUNTER (OUTPATIENT)
Age: 64
Discharge: HOME | End: 2019-04-25
Payer: OTHER GOVERNMENT

## 2019-04-25 ENCOUNTER — APPOINTMENT (OUTPATIENT)
Dept: GENERAL RADIOLOGY | Age: 64
End: 2019-04-25
Attending: PHYSICIAN ASSISTANT
Payer: OTHER GOVERNMENT

## 2019-04-25 VITALS
SYSTOLIC BLOOD PRESSURE: 148 MMHG | DIASTOLIC BLOOD PRESSURE: 83 MMHG | RESPIRATION RATE: 18 BRPM | OXYGEN SATURATION: 97 % | HEART RATE: 83 BPM | TEMPERATURE: 98 F

## 2019-04-25 DIAGNOSIS — S62.347A CLOSED NONDISPLACED FRACTURE OF BASE OF FIFTH METACARPAL BONE OF LEFT HAND, INITIAL ENCOUNTER: Primary | ICD-10-CM

## 2019-04-25 PROCEDURE — 73130 X-RAY EXAM OF HAND: CPT | Performed by: PHYSICIAN ASSISTANT

## 2019-04-25 PROCEDURE — 99214 OFFICE O/P EST MOD 30 MIN: CPT

## 2019-04-25 NOTE — ED INITIAL ASSESSMENT (HPI)
C/O left hand injury that occurred yesterday, sts that she had tripped over a garbage can and fell forward. Bruising and swelling noted.

## 2019-04-25 NOTE — ED PROVIDER NOTES
Patient Seen in: [facility]    History   Patient presents with:  Hand Injury    Stated Complaint: hand injury     HPI    CHIEF COMPLAINT: Left hand injury     HISTORY OF PRESENT ILLNESS: Patient is a pleasant 81-year-old female years. personal history of polyps and personal history of paternal colon cancer   • CYSTOSCOPY URETEROSCOPY Right 3/24/2018    Performed by Adamaris Richards MD at [facility] MAIN OR   • FEMUR/KNEE SURG UNLISTED     • FOOT/TOES SURGERY PROC UNLISTED     • DIANA BIOPSY S Extremities are symmetrical, full range of motion  Skin:  warm and dry, no rashes. Left hand: There is edema and ecchymosis noted overlying the fourth and fifth metacarpals both over the volar and dorsal aspect of the hand.   There is tenderness to palpat Impression:  Closed nondisplaced fracture of base of fifth metacarpal bone of left hand, initial encounter  (primary encounter diagnosis)    Disposition:  Discharge  4/25/2019 12:21 pm    Follow-up:  Nikki Patterson MD  [address] [place] [address]

## 2019-04-29 PROBLEM — S62.347A CLOSED NONDISPLACED FRACTURE OF BASE OF FIFTH METACARPAL BONE OF LEFT HAND, INITIAL ENCOUNTER: Status: ACTIVE | Noted: 2019-04-29

## 2019-04-30 ENCOUNTER — APPOINTMENT (OUTPATIENT)
Dept: LAB | Age: 64
End: 2019-04-30
Attending: NURSE PRACTITIONER
Payer: OTHER GOVERNMENT

## 2019-04-30 DIAGNOSIS — Z51.81 ENCOUNTER FOR THERAPEUTIC DRUG MONITORING: ICD-10-CM

## 2019-04-30 DIAGNOSIS — E66.01 MORBID OBESITY WITH BMI OF 40.0-44.9, ADULT (HCC): ICD-10-CM

## 2019-04-30 PROCEDURE — 82397 CHEMILUMINESCENT ASSAY: CPT | Performed by: NURSE PRACTITIONER

## 2019-04-30 PROCEDURE — 82306 VITAMIN D 25 HYDROXY: CPT | Performed by: NURSE PRACTITIONER

## 2019-04-30 PROCEDURE — 36415 COLL VENOUS BLD VENIPUNCTURE: CPT | Performed by: NURSE PRACTITIONER

## 2019-04-30 PROCEDURE — 82607 VITAMIN B-12: CPT | Performed by: NURSE PRACTITIONER

## 2019-05-06 ENCOUNTER — OFFICE VISIT (OUTPATIENT)
Dept: INTERNAL MEDICINE CLINIC | Facility: CLINIC | Age: 64
End: 2019-05-06
Payer: OTHER GOVERNMENT

## 2019-05-06 VITALS
SYSTOLIC BLOOD PRESSURE: 130 MMHG | DIASTOLIC BLOOD PRESSURE: 80 MMHG | BODY MASS INDEX: 38.4 KG/M2 | RESPIRATION RATE: 14 BRPM | WEIGHT: 214 LBS | HEART RATE: 78 BPM | HEIGHT: 62.5 IN

## 2019-05-06 DIAGNOSIS — E66.01 MORBID OBESITY (HCC): ICD-10-CM

## 2019-05-06 DIAGNOSIS — E53.8 VITAMIN B12 DEFICIENCY: ICD-10-CM

## 2019-05-06 DIAGNOSIS — E55.9 VITAMIN D DEFICIENCY: ICD-10-CM

## 2019-05-06 DIAGNOSIS — Z51.81 ENCOUNTER FOR THERAPEUTIC DRUG MONITORING: Primary | ICD-10-CM

## 2019-05-06 DIAGNOSIS — R73.03 PRE-DIABETES: ICD-10-CM

## 2019-05-06 DIAGNOSIS — K76.0 FATTY LIVER: ICD-10-CM

## 2019-05-06 DIAGNOSIS — E78.00 HIGH CHOLESTEROL: ICD-10-CM

## 2019-05-06 PROCEDURE — 96372 THER/PROPH/DIAG INJ SC/IM: CPT | Performed by: NURSE PRACTITIONER

## 2019-05-06 PROCEDURE — 99214 OFFICE O/P EST MOD 30 MIN: CPT | Performed by: NURSE PRACTITIONER

## 2019-05-06 RX ORDER — CYANOCOBALAMIN 1000 UG/ML
1000 INJECTION INTRAMUSCULAR; SUBCUTANEOUS ONCE
Status: COMPLETED | OUTPATIENT
Start: 2019-05-06 | End: 2019-05-06

## 2019-05-06 RX ADMIN — CYANOCOBALAMIN 1000 MCG: 1000 INJECTION INTRAMUSCULAR; SUBCUTANEOUS at 10:42:00

## 2019-05-06 NOTE — PROGRESS NOTES
Kianna De La Rosa is a 59year old female presents today for 1 month follow-up on medical weight loss program for the treatment of overweight, obesity, or morbid obesity with associated prediabetes, fatty liver, vitamin D and B12 deficiency, hyperlipidemi GENERAL: well developed, well nourished, in no apparent distress, obese  EYES: conjunctiva pink, sclera non icteric, PERRLA  LUNGS: CTA in all fields, breathing non labored  CARDIO: RRR without murmur, normal S1 and S2 without clicks or gallops, no pedal e Next steps to work on before next office visit include: Great work with tracking nutrition and changing food choices to incorporate more whole food! Visit yourPopsettters. org blog daily for support and education daily.  Add strength training as tolerated 1. Burn more calories. Unlike fat, muscle beefs up your metabolism to help you burn about 70% more calories than fat can. 2. Improve appearance. When done properly, strength training can greatly improve your posture and help to prevent joint pain.   [facility]

## 2019-05-06 NOTE — PATIENT INSTRUCTIONS
Continue making lifestyle changes that focus on good nutrition, regular exercise and stress management. Medication Plan: none at this time. Increase Vitamin D to 5000 IU daily. Start Vitamin B12 injection monthly today.     Next steps to work on before n Although daily physical activity, like walking, swimming and aerobics are essential to good heart health and weight management, combining muscle building weight training with cardiovascular exercise, gives you an unbeatable combination to lose fat and keep

## 2019-06-03 ENCOUNTER — OFFICE VISIT (OUTPATIENT)
Dept: INTERNAL MEDICINE CLINIC | Facility: CLINIC | Age: 64
End: 2019-06-03

## 2019-06-03 VITALS
SYSTOLIC BLOOD PRESSURE: 110 MMHG | HEIGHT: 62.5 IN | BODY MASS INDEX: 38.22 KG/M2 | WEIGHT: 213 LBS | DIASTOLIC BLOOD PRESSURE: 70 MMHG | HEART RATE: 80 BPM | RESPIRATION RATE: 14 BRPM

## 2019-06-03 DIAGNOSIS — K76.0 FATTY LIVER: ICD-10-CM

## 2019-06-03 DIAGNOSIS — Z51.81 ENCOUNTER FOR THERAPEUTIC DRUG MONITORING: Primary | ICD-10-CM

## 2019-06-03 DIAGNOSIS — R73.03 PRE-DIABETES: ICD-10-CM

## 2019-06-03 DIAGNOSIS — E66.01 MORBID OBESITY (HCC): ICD-10-CM

## 2019-06-03 DIAGNOSIS — E53.8 VITAMIN B12 DEFICIENCY: ICD-10-CM

## 2019-06-03 PROCEDURE — 99213 OFFICE O/P EST LOW 20 MIN: CPT | Performed by: NURSE PRACTITIONER

## 2019-06-03 PROCEDURE — 96372 THER/PROPH/DIAG INJ SC/IM: CPT | Performed by: NURSE PRACTITIONER

## 2019-06-03 RX ORDER — CYANOCOBALAMIN 1000 UG/ML
1000 INJECTION INTRAMUSCULAR; SUBCUTANEOUS ONCE
Status: COMPLETED | OUTPATIENT
Start: 2019-06-03 | End: 2019-06-03

## 2019-06-03 RX ADMIN — CYANOCOBALAMIN 1000 MCG: 1000 INJECTION INTRAMUSCULAR; SUBCUTANEOUS at 15:23:00

## 2019-06-03 NOTE — PATIENT INSTRUCTIONS
Continue making lifestyle changes that focus on good nutrition, regular exercise and stress management. Medication Plan: Vitamin B12 injection monthly.     Next steps to work on before next office visit include: Now that summer is here, below are tips fo · Choose a few different activities so you’ll stay interested. Make it fun! What will help you to stick with it? 1.   2.   3.   Date Last Reviewed: 8/13/2015  © 8270-3710 The [address] [place] [address], [address] [place], [place] [address] [employer].  All rights re · Order 1 or 2 low-fat appetizers or soup and a salad instead of a main dish. Or eat only half of the main dish and take the rest home. · If you want a dessert, try fresh fruit, nonfat yogurt, or sorbet. Or share a dessert.   Fast food tips  · Choose grill · For dessert, enjoy fruit, sorbet, or low-fat frozen yogurt. Share a rich dessert with friends.   · Make a meal out of a low-fat appetizer (such as shrimp cocktail or fresh pasta), bread, and salad as your main meal. Ask for an appetizer-size portion of an

## 2019-06-03 NOTE — PROGRESS NOTES
Sebastian Garcia is a 59year old female presents today for 2 month follow-up on medical weight loss program for the treatment of overweight, obesity, or morbid obesity with associated prediabetes, fatty liver, vitamin D and B12 deficiency, hyperlipidemi LUNGS: CTA in all fields, breathing non labored  CARDIO: RRR without murmur, normal S1 and S2 without clicks or gallops, no pedal edema. GI: +BS, soft  NEURO/MS: motor and sensory grossly intact. Left wrist in splint.   PSYCH: pleasant, cooperative, normal · Faster. Hike, run, or skate fast enough to raise your heart rate moderately—as if you had walked fast to catch a bus. · More often. Do your activity 4 to 6 times a week instead of 1 to 3 times.     Not just gym class  Be creative. You can reach your heal It’s not easy to change the habits of a lifetime. Experts say that it can take 6 months just to change one old habit for a healthier one. That’s why gradual change is so important.  These tips are reminders of the dozens of small ways you can change your ha · Remove the skin from fried chicken and choose mashed potatoes, corn on the cob, and baked beans on the side. · Order a broiled chicken salad and pile on fresh vegetables from the salad bar. Use a small amount of low-fat dressing.   · Top a baked potato w · Sweets and foods made with butter, coconut or palm oil, or hydrogenated fats    © 1289-1883 The [employer] [facility] [employer], [address] [place]. All rights reserved.  This information is not intended as a substitute for professional medic

## 2019-07-29 RX ORDER — ATORVASTATIN CALCIUM 10 MG/1
TABLET, FILM COATED ORAL
Qty: 90 TABLET | Refills: 1 | Status: SHIPPED | OUTPATIENT
Start: 2019-07-29 | End: 2020-01-27

## 2019-09-19 ENCOUNTER — OFFICE VISIT (OUTPATIENT)
Dept: INTERNAL MEDICINE CLINIC | Facility: CLINIC | Age: 64
End: 2019-09-19
Payer: OTHER GOVERNMENT

## 2019-09-19 VITALS
BODY MASS INDEX: 35 KG/M2 | WEIGHT: 196 LBS | SYSTOLIC BLOOD PRESSURE: 114 MMHG | TEMPERATURE: 99 F | HEART RATE: 72 BPM | RESPIRATION RATE: 16 BRPM | DIASTOLIC BLOOD PRESSURE: 70 MMHG

## 2019-09-19 DIAGNOSIS — N76.0 ACUTE VAGINITIS: Primary | ICD-10-CM

## 2019-09-19 DIAGNOSIS — N89.8 VAGINAL LESION: ICD-10-CM

## 2019-09-19 PROCEDURE — 87510 GARDNER VAG DNA DIR PROBE: CPT | Performed by: PHYSICIAN ASSISTANT

## 2019-09-19 PROCEDURE — 87660 TRICHOMONAS VAGIN DIR PROBE: CPT | Performed by: PHYSICIAN ASSISTANT

## 2019-09-19 PROCEDURE — 87480 CANDIDA DNA DIR PROBE: CPT | Performed by: PHYSICIAN ASSISTANT

## 2019-09-19 PROCEDURE — 99213 OFFICE O/P EST LOW 20 MIN: CPT | Performed by: PHYSICIAN ASSISTANT

## 2019-09-19 NOTE — PROGRESS NOTES
Patient presents with:  Vaginal Problem: c/o vaginal irratation for 1 week used Monistat 3 day helped a little still has concern      HPI:  Pt presents with c/o vaginal itching for 1 weeks. Denies vaginal d/c.   Did try Monistat ovules X 3 days as well as (BOSWELLIA OR) Take 500 mg by mouth 2 (two) times daily. Disp:  Rfl:    Probiotic Product (PROBIOTIC-10 OR) Take by mouth. Disp:  Rfl:    Magnesium 300 MG Oral Cap Take by mouth. Disp:  Rfl:    Pyridoxine HCl (VITAMIN B-6 OR) Take by mouth.  Disp:  Rfl:

## 2019-09-23 ENCOUNTER — TELEPHONE (OUTPATIENT)
Dept: INTERNAL MEDICINE CLINIC | Facility: CLINIC | Age: 64
End: 2019-09-23

## 2019-09-23 PROBLEM — E66.01 MORBID OBESITY (HCC): Status: RESOLVED | Noted: 2019-05-06 | Resolved: 2019-09-23

## 2019-09-23 NOTE — TELEPHONE ENCOUNTER
Patient Review of Clinical Information     Problems   This clinical information was not verified, but there are updates pending review:   No Longer Applicable Problems Start Date Reported By  Comments   Morbid obesity 5/6/2019 Dakotah Baker Be

## 2020-01-25 DIAGNOSIS — R73.9 HYPERGLYCEMIA: ICD-10-CM

## 2020-01-25 DIAGNOSIS — E55.9 VITAMIN D DEFICIENCY: ICD-10-CM

## 2020-01-25 DIAGNOSIS — E53.8 VITAMIN B12 DEFICIENCY: ICD-10-CM

## 2020-01-25 DIAGNOSIS — Z13.0 SCREENING FOR BLOOD DISEASE: ICD-10-CM

## 2020-01-25 DIAGNOSIS — E53.8 B12 DEFICIENCY: ICD-10-CM

## 2020-01-25 DIAGNOSIS — E78.00 HIGH CHOLESTEROL: Primary | ICD-10-CM

## 2020-01-27 RX ORDER — ATORVASTATIN CALCIUM 10 MG/1
TABLET, FILM COATED ORAL
Qty: 90 TABLET | Refills: 0 | Status: SHIPPED | OUTPATIENT
Start: 2020-01-27 | End: 2020-04-27

## 2020-01-27 NOTE — TELEPHONE ENCOUNTER
Last Ov: 9/19/19, CB, acute   Upcoming appt: no upcoming appt  Last labs: Leptin, Vit B12, Vit D 4/30/19  Last Rx: atorvastatin 10mg, #90, 1R 7/29/19    Per Protocol, failed. Pt due for lipid, liver enzyme testing, and last ALT out of range. Rx pending.

## 2020-01-27 NOTE — TELEPHONE ENCOUNTER
I placed fasting lab orders for her to do soon. She is due for CPE as well, please remind her to schedule.

## 2020-01-28 ENCOUNTER — TELEPHONE (OUTPATIENT)
Dept: INTERNAL MEDICINE CLINIC | Facility: CLINIC | Age: 65
End: 2020-01-28

## 2020-01-28 DIAGNOSIS — Z12.31 ENCOUNTER FOR SCREENING MAMMOGRAM FOR MALIGNANT NEOPLASM OF BREAST: Primary | ICD-10-CM

## 2020-01-28 NOTE — TELEPHONE ENCOUNTER
Requesting Mammo order aware must contact CS to schedule.      CPE   Future Appointments   Date Time Provider Maximino Loyola   2/6/2020 11:00 AM Stacia Bentley PA-C [facility]     Orders to Cj Hodges aware must fast no call back required

## 2020-01-31 ENCOUNTER — LAB ENCOUNTER (OUTPATIENT)
Dept: LAB | Age: 65
End: 2020-01-31
Attending: PHYSICIAN ASSISTANT
Payer: OTHER GOVERNMENT

## 2020-01-31 DIAGNOSIS — E78.00 HIGH CHOLESTEROL: ICD-10-CM

## 2020-01-31 DIAGNOSIS — Z13.0 SCREENING FOR BLOOD DISEASE: ICD-10-CM

## 2020-01-31 DIAGNOSIS — E53.8 VITAMIN B12 DEFICIENCY: ICD-10-CM

## 2020-01-31 DIAGNOSIS — E55.9 VITAMIN D DEFICIENCY: ICD-10-CM

## 2020-01-31 DIAGNOSIS — R73.9 HYPERGLYCEMIA: ICD-10-CM

## 2020-01-31 LAB
ALBUMIN SERPL-MCNC: 3.9 G/DL (ref 3.4–5)
ALBUMIN/GLOB SERPL: 1.1 {RATIO} (ref 1–2)
ALP LIVER SERPL-CCNC: 96 U/L (ref 50–130)
ALT SERPL-CCNC: 24 U/L (ref 13–56)
ANION GAP SERPL CALC-SCNC: 5 MMOL/L (ref 0–18)
AST SERPL-CCNC: 17 U/L (ref 15–37)
BASOPHILS # BLD AUTO: 0.05 X10(3) UL (ref 0–0.2)
BASOPHILS NFR BLD AUTO: 0.6 %
BILIRUB SERPL-MCNC: 1.3 MG/DL (ref 0.1–2)
BUN BLD-MCNC: 13 MG/DL (ref 7–18)
BUN/CREAT SERPL: 16.9 (ref 10–20)
CALCIUM BLD-MCNC: 9.7 MG/DL (ref 8.5–10.1)
CHLORIDE SERPL-SCNC: 107 MMOL/L (ref 98–112)
CHOLEST SMN-MCNC: 191 MG/DL (ref ?–200)
CO2 SERPL-SCNC: 30 MMOL/L (ref 21–32)
CREAT BLD-MCNC: 0.77 MG/DL (ref 0.55–1.02)
DEPRECATED RDW RBC AUTO: 41 FL (ref 35.1–46.3)
EOSINOPHIL # BLD AUTO: 0.11 X10(3) UL (ref 0–0.7)
EOSINOPHIL NFR BLD AUTO: 1.4 %
ERYTHROCYTE [DISTWIDTH] IN BLOOD BY AUTOMATED COUNT: 12.1 % (ref 11–15)
EST. AVERAGE GLUCOSE BLD GHB EST-MCNC: 108 MG/DL (ref 68–126)
GLOBULIN PLAS-MCNC: 3.7 G/DL (ref 2.8–4.4)
GLUCOSE BLD-MCNC: 89 MG/DL (ref 70–99)
HBA1C MFR BLD HPLC: 5.4 % (ref ?–5.7)
HCT VFR BLD AUTO: 44.1 % (ref 35–48)
HDLC SERPL-MCNC: 72 MG/DL (ref 40–59)
HGB BLD-MCNC: 15 G/DL (ref 12–16)
IMM GRANULOCYTES # BLD AUTO: 0.02 X10(3) UL (ref 0–1)
IMM GRANULOCYTES NFR BLD: 0.3 %
LDLC SERPL CALC-MCNC: 107 MG/DL (ref ?–100)
LYMPHOCYTES # BLD AUTO: 1.56 X10(3) UL (ref 1–4)
LYMPHOCYTES NFR BLD AUTO: 19.7 %
M PROTEIN MFR SERPL ELPH: 7.6 G/DL (ref 6.4–8.2)
MCH RBC QN AUTO: 31.5 PG (ref 26–34)
MCHC RBC AUTO-ENTMCNC: 34 G/DL (ref 31–37)
MCV RBC AUTO: 92.6 FL (ref 80–100)
MONOCYTES # BLD AUTO: 0.48 X10(3) UL (ref 0.1–1)
MONOCYTES NFR BLD AUTO: 6.1 %
NEUTROPHILS # BLD AUTO: 5.71 X10 (3) UL (ref 1.5–7.7)
NEUTROPHILS # BLD AUTO: 5.71 X10(3) UL (ref 1.5–7.7)
NEUTROPHILS NFR BLD AUTO: 71.9 %
NONHDLC SERPL-MCNC: 119 MG/DL (ref ?–130)
OSMOLALITY SERPL CALC.SUM OF ELEC: 294 MOSM/KG (ref 275–295)
PATIENT FASTING Y/N/NP: YES
PATIENT FASTING Y/N/NP: YES
PLATELET # BLD AUTO: 306 10(3)UL (ref 150–450)
POTASSIUM SERPL-SCNC: 5 MMOL/L (ref 3.5–5.1)
RBC # BLD AUTO: 4.76 X10(6)UL (ref 3.8–5.3)
SODIUM SERPL-SCNC: 142 MMOL/L (ref 136–145)
TRIGL SERPL-MCNC: 59 MG/DL (ref 30–149)
TSI SER-ACNC: 1.22 MIU/ML (ref 0.36–3.74)
VIT B12 SERPL-MCNC: 1915 PG/ML (ref 193–986)
VIT D+METAB SERPL-MCNC: 47.2 NG/ML (ref 30–100)
VLDLC SERPL CALC-MCNC: 12 MG/DL (ref 0–30)
WBC # BLD AUTO: 7.9 X10(3) UL (ref 4–11)

## 2020-01-31 PROCEDURE — 36415 COLL VENOUS BLD VENIPUNCTURE: CPT | Performed by: PHYSICIAN ASSISTANT

## 2020-01-31 PROCEDURE — 80050 GENERAL HEALTH PANEL: CPT | Performed by: PHYSICIAN ASSISTANT

## 2020-01-31 PROCEDURE — 82306 VITAMIN D 25 HYDROXY: CPT | Performed by: PHYSICIAN ASSISTANT

## 2020-01-31 PROCEDURE — 80061 LIPID PANEL: CPT | Performed by: PHYSICIAN ASSISTANT

## 2020-01-31 PROCEDURE — 83036 HEMOGLOBIN GLYCOSYLATED A1C: CPT | Performed by: PHYSICIAN ASSISTANT

## 2020-01-31 PROCEDURE — 82607 VITAMIN B-12: CPT | Performed by: PHYSICIAN ASSISTANT

## 2020-01-31 NOTE — TELEPHONE ENCOUNTER
Future Appointments   Date Time Provider Maximino Loyola   2/6/2020 11:00 AM Elizabeth Marin PA-C [facility]

## 2020-02-06 ENCOUNTER — OFFICE VISIT (OUTPATIENT)
Dept: INTERNAL MEDICINE CLINIC | Facility: CLINIC | Age: 65
End: 2020-02-06
Payer: OTHER GOVERNMENT

## 2020-02-06 ENCOUNTER — HOSPITAL ENCOUNTER (OUTPATIENT)
Dept: MAMMOGRAPHY | Age: 65
Discharge: HOME | End: 2020-02-06
Attending: PHYSICIAN ASSISTANT
Payer: OTHER GOVERNMENT

## 2020-02-06 VITALS
HEIGHT: 63 IN | OXYGEN SATURATION: 94 % | DIASTOLIC BLOOD PRESSURE: 82 MMHG | RESPIRATION RATE: 16 BRPM | WEIGHT: 198 LBS | TEMPERATURE: 98 F | SYSTOLIC BLOOD PRESSURE: 138 MMHG | HEART RATE: 74 BPM | BODY MASS INDEX: 35.08 KG/M2

## 2020-02-06 DIAGNOSIS — Z80.0 FAMILY HISTORY OF COLON CANCER IN FATHER: ICD-10-CM

## 2020-02-06 DIAGNOSIS — Z00.00 ANNUAL PHYSICAL EXAM: Primary | ICD-10-CM

## 2020-02-06 DIAGNOSIS — R73.03 PRE-DIABETES: ICD-10-CM

## 2020-02-06 DIAGNOSIS — E55.9 VITAMIN D DEFICIENCY: ICD-10-CM

## 2020-02-06 DIAGNOSIS — E78.00 HIGH CHOLESTEROL: ICD-10-CM

## 2020-02-06 DIAGNOSIS — E66.09 CLASS 2 OBESITY DUE TO EXCESS CALORIES WITHOUT SERIOUS COMORBIDITY WITH BODY MASS INDEX (BMI) OF 35.0 TO 35.9 IN ADULT: ICD-10-CM

## 2020-02-06 DIAGNOSIS — E53.8 B12 DEFICIENCY: ICD-10-CM

## 2020-02-06 DIAGNOSIS — Z12.31 ENCOUNTER FOR SCREENING MAMMOGRAM FOR MALIGNANT NEOPLASM OF BREAST: ICD-10-CM

## 2020-02-06 PROBLEM — E66.812 CLASS 2 OBESITY DUE TO EXCESS CALORIES WITHOUT SERIOUS COMORBIDITY WITH BODY MASS INDEX (BMI) OF 35.0 TO 35.9 IN ADULT: Status: ACTIVE | Noted: 2019-05-06

## 2020-02-06 PROCEDURE — 77067 SCR MAMMO BI INCL CAD: CPT | Performed by: PHYSICIAN ASSISTANT

## 2020-02-06 PROCEDURE — 99396 PREV VISIT EST AGE 40-64: CPT | Performed by: PHYSICIAN ASSISTANT

## 2020-02-06 PROCEDURE — 77063 BREAST TOMOSYNTHESIS BI: CPT | Performed by: PHYSICIAN ASSISTANT

## 2020-02-06 NOTE — PROGRESS NOTES
Patient presents with:  Physical: RG rm 2 Physical, haing mammo later this afternoon      HPI:  Pt presents for annual physical.  Mammo scheduled this afternoon. Pt denies any new problems today.   Obesity - Pt initially lost some weight through diet strange Calculus of both kidneys - No current issues     Lumbar radiculopathy - No current issues     HNP (herniated nucleus pulposus), lumbar     Azotemia     Right ureteral stone     BMI 40.0-44.9, adult ([address] - Has been able to keep weight she lost off.   She is Father    • Other (Other) Mother         [employer]     Social History    Tobacco Use      Smoking status: Former Smoker        Quit date: 1975        Years since quittin.7      Smokeless tobacco: Never Used    Alcohol use:  Yes      Alcohol/week: >, Im                          10/31/2015      HEP B                 12/11/1997 01/13/1998 06/29/1998      Hep B, Unspecified Formulation                          12/11/1997 01/13/1998 06/29/1998      Influenza             11/16/2010  11/01/2015  10/14 noted. No erythema. No pallor. Saw Derm recently for TBE. Psychiatric: Normal mood and affect.      [facility] Lab Encounter on 01/31/2020   Component Date Value Ref Range Status   • Vitamin D, 25OH, Total 01/31/2020 47.2  30.0 - 100.0 ng/mL Final   • Vitamin B12 - 11.0 x10(3) uL Final   • RBC 01/31/2020 4.76  3.80 - 5.30 x10(6)uL Final   • HGB 01/31/2020 15.0  12.0 - 16.0 g/dL Final   • HCT 01/31/2020 44.1  35.0 - 48.0 % Final   • PLT 01/31/2020 306.0  150.0 - 450.0 10(3)uL Final   • MCV 01/31/2020 92.6  80.0 - 10 March, pt will schedule. Pre-diabetes - Pt has lost weight. Gluc was normal.  Will need to repeat A1c with next labs. No orders of the defined types were placed in this encounter.       Meds & Refills for this Visit:  Requested Prescriptions      No pr

## 2020-04-26 DIAGNOSIS — E78.00 HIGH CHOLESTEROL: ICD-10-CM

## 2020-04-27 RX ORDER — ATORVASTATIN CALCIUM 10 MG/1
TABLET, FILM COATED ORAL
Qty: 90 TABLET | Refills: 0 | Status: SHIPPED | OUTPATIENT
Start: 2020-04-27 | End: 2020-07-25

## 2020-04-27 NOTE — TELEPHONE ENCOUNTER
Last VISIT 2/6/20 CB    Last REFILL 1/27/20 Atorvastatin 90T 0R    Last LABS  1/31/20 VitD, VitB12, HgA1c, TSH, Lipid, CMP, CBC    No future appointments. Per PROTOCOL? Cholesterol protocol passed, RX sent     Please Approve or Deny.

## 2020-07-25 DIAGNOSIS — E78.00 HIGH CHOLESTEROL: ICD-10-CM

## 2020-07-25 RX ORDER — ATORVASTATIN CALCIUM 10 MG/1
TABLET, FILM COATED ORAL
Qty: 90 TABLET | Refills: 1 | Status: SHIPPED | OUTPATIENT
Start: 2020-07-25 | End: 2021-01-21

## 2020-10-02 PROBLEM — N20.1 RIGHT URETERAL STONE: Status: RESOLVED | Noted: 2018-03-23 | Resolved: 2020-10-02

## 2020-10-02 PROBLEM — R79.89 AZOTEMIA: Status: RESOLVED | Noted: 2018-03-23 | Resolved: 2020-10-02

## 2020-10-24 ENCOUNTER — APPOINTMENT (OUTPATIENT)
Dept: LAB | Facility: HOSPITAL | Age: 65
End: 2020-10-24
Attending: INTERNAL MEDICINE
Payer: OTHER GOVERNMENT

## 2020-10-24 DIAGNOSIS — Z01.818 PRE-OP TESTING: ICD-10-CM

## 2020-10-27 PROBLEM — K63.5 COLON POLYPS: Status: ACTIVE | Noted: 2020-10-27

## 2020-10-27 PROBLEM — Z86.010 HISTORY OF ADENOMATOUS POLYP OF COLON: Status: ACTIVE | Noted: 2020-10-27

## 2020-10-27 PROBLEM — Z86.0101 HISTORY OF ADENOMATOUS POLYP OF COLON: Status: ACTIVE | Noted: 2020-10-27

## 2020-10-27 PROBLEM — Z80.0 FAMILY HISTORY OF COLON CANCER: Status: ACTIVE | Noted: 2020-10-27

## 2020-10-27 PROBLEM — K64.8 INTERNAL HEMORRHOIDS: Status: ACTIVE | Noted: 2020-10-27

## 2020-10-27 PROBLEM — K57.30 DIVERTICULOSIS OF COLON: Status: ACTIVE | Noted: 2020-10-27

## 2020-10-27 PROBLEM — Z12.11 SPECIAL SCREENING FOR MALIGNANT NEOPLASM OF COLON: Status: ACTIVE | Noted: 2020-10-27

## 2021-01-06 ENCOUNTER — TELEPHONE (OUTPATIENT)
Dept: INTERNAL MEDICINE CLINIC | Facility: CLINIC | Age: 66
End: 2021-01-06

## 2021-01-06 DIAGNOSIS — Z12.31 ENCOUNTER FOR SCREENING MAMMOGRAM FOR MALIGNANT NEOPLASM OF BREAST: Primary | ICD-10-CM

## 2021-01-21 DIAGNOSIS — E78.00 HIGH CHOLESTEROL: ICD-10-CM

## 2021-01-21 RX ORDER — ATORVASTATIN CALCIUM 10 MG/1
TABLET, FILM COATED ORAL
Qty: 90 TABLET | Refills: 0 | Status: SHIPPED | OUTPATIENT
Start: 2021-01-21 | End: 2021-04-21

## 2021-02-04 ENCOUNTER — OFFICE VISIT (OUTPATIENT)
Dept: INTERNAL MEDICINE CLINIC | Facility: CLINIC | Age: 66
End: 2021-02-04
Payer: OTHER GOVERNMENT

## 2021-02-04 ENCOUNTER — TELEPHONE (OUTPATIENT)
Dept: INTERNAL MEDICINE CLINIC | Facility: CLINIC | Age: 66
End: 2021-02-04

## 2021-02-04 ENCOUNTER — PATIENT MESSAGE (OUTPATIENT)
Dept: INTERNAL MEDICINE CLINIC | Facility: CLINIC | Age: 66
End: 2021-02-04

## 2021-02-04 VITALS
OXYGEN SATURATION: 98 % | TEMPERATURE: 98 F | WEIGHT: 209 LBS | HEIGHT: 63 IN | SYSTOLIC BLOOD PRESSURE: 126 MMHG | DIASTOLIC BLOOD PRESSURE: 78 MMHG | HEART RATE: 72 BPM | RESPIRATION RATE: 18 BRPM | BODY MASS INDEX: 37.03 KG/M2

## 2021-02-04 DIAGNOSIS — R22.0 FACIAL SWELLING: Primary | ICD-10-CM

## 2021-02-04 DIAGNOSIS — Z13.29 THYROID DISORDER SCREEN: ICD-10-CM

## 2021-02-04 DIAGNOSIS — E53.8 VITAMIN B12 DEFICIENCY: ICD-10-CM

## 2021-02-04 DIAGNOSIS — Z13.0 SCREENING FOR BLOOD DISEASE: ICD-10-CM

## 2021-02-04 DIAGNOSIS — E78.00 HIGH CHOLESTEROL: Primary | ICD-10-CM

## 2021-02-04 DIAGNOSIS — E55.9 VITAMIN D DEFICIENCY: ICD-10-CM

## 2021-02-04 PROCEDURE — 3008F BODY MASS INDEX DOCD: CPT | Performed by: PHYSICIAN ASSISTANT

## 2021-02-04 PROCEDURE — 3078F DIAST BP <80 MM HG: CPT | Performed by: PHYSICIAN ASSISTANT

## 2021-02-04 PROCEDURE — 3074F SYST BP LT 130 MM HG: CPT | Performed by: PHYSICIAN ASSISTANT

## 2021-02-04 PROCEDURE — 99213 OFFICE O/P EST LOW 20 MIN: CPT | Performed by: PHYSICIAN ASSISTANT

## 2021-02-04 RX ORDER — PREDNISONE 20 MG/1
20 TABLET ORAL DAILY
Qty: 3 TABLET | Refills: 0 | Status: SHIPPED | OUTPATIENT
Start: 2021-02-04 | End: 2021-02-07

## 2021-02-04 RX ORDER — PREDNISONE 20 MG/1
20 TABLET ORAL DAILY
Qty: 3 TABLET | Refills: 0 | Status: SHIPPED
Start: 2021-02-04 | End: 2021-02-04

## 2021-02-04 NOTE — TELEPHONE ENCOUNTER
Pt stated she woke with her faced swollen. Pt stated she didn't eat or do anything different. Pt scheduled on below for today. High TE. Please advise.

## 2021-02-04 NOTE — PROGRESS NOTES
Patient presents with:  Edema: RG rm 6 Swelling of right side of face when she woke up today, odd sensation      HPI:  Pt presents with c/o swelling and redness to her cheeks that she woke up with this AM.  The area \"feels weird\" but she denies numbness neoplasm of colon     Colon polyps     Diverticulosis     Internal hemorrhoids      Current Outpatient Medications   Medication Sig Dispense Refill   • predniSONE 20 MG Oral Tab Take 1 tablet (20 mg total) by mouth daily for 3 days.  3 tablet 0   • ATORVAST Continue daily non-sedating antihistamine. Add Prednsione burst for 3 days (20 mg). ER warnings given. Pt to call with any increase in sxs, new sxs, other questions or problems.     HM - Return soon for CPE    No orders of the defined types were placed i

## 2021-02-04 NOTE — TELEPHONE ENCOUNTER
Patient states facial cheeks are red bilateral. Patient states not entire face; cheeks and bags under her eyes are more \"puffy\". Since onset has seen some improvement. Patient has not taken benadryl.  Patient currently scheduled with CB today for evaluat

## 2021-02-06 ENCOUNTER — HOSPITAL ENCOUNTER (OUTPATIENT)
Dept: MAMMOGRAPHY | Age: 66
Discharge: HOME | End: 2021-02-06
Attending: NURSE PRACTITIONER
Payer: OTHER GOVERNMENT

## 2021-02-06 DIAGNOSIS — Z12.31 ENCOUNTER FOR SCREENING MAMMOGRAM FOR MALIGNANT NEOPLASM OF BREAST: ICD-10-CM

## 2021-02-06 PROCEDURE — 77067 SCR MAMMO BI INCL CAD: CPT | Performed by: NURSE PRACTITIONER

## 2021-02-06 PROCEDURE — 77063 BREAST TOMOSYNTHESIS BI: CPT | Performed by: NURSE PRACTITIONER

## 2021-04-12 ENCOUNTER — IMMUNIZATION (OUTPATIENT)
Dept: LAB | Age: 66
End: 2021-04-12
Attending: HOSPITALIST
Payer: OTHER GOVERNMENT

## 2021-04-12 DIAGNOSIS — Z23 NEED FOR VACCINATION: Primary | ICD-10-CM

## 2021-04-12 PROCEDURE — 0001A SARSCOV2 VAC 30MCG/0.3ML IM: CPT

## 2021-04-21 DIAGNOSIS — E78.00 HIGH CHOLESTEROL: ICD-10-CM

## 2021-04-21 RX ORDER — ATORVASTATIN CALCIUM 10 MG/1
TABLET, FILM COATED ORAL
Qty: 90 TABLET | Refills: 0 | Status: SHIPPED | OUTPATIENT
Start: 2021-04-21 | End: 2021-07-20

## 2021-04-21 NOTE — TELEPHONE ENCOUNTER
I sent a 90 day refill. She is overdue for labs and CPE. Please remind her to schedule and do her labs prior to visit.

## 2021-04-21 NOTE — TELEPHONE ENCOUNTER
Last Ov:2/4/21  Upcoming appt:none  Last labs:no recent  Last Rx:1/21/21 atorvastatin     Per Protocol sent for review

## 2021-04-23 NOTE — TELEPHONE ENCOUNTER
Spoke with pt. She is aware she is due for labs and cpe appt. She was driving and will call us back to schedule.

## 2021-05-03 ENCOUNTER — IMMUNIZATION (OUTPATIENT)
Dept: LAB | Age: 66
End: 2021-05-03
Attending: HOSPITALIST
Payer: OTHER GOVERNMENT

## 2021-05-03 DIAGNOSIS — Z23 NEED FOR VACCINATION: Primary | ICD-10-CM

## 2021-05-03 PROCEDURE — 0002A SARSCOV2 VAC 30MCG/0.3ML IM: CPT

## 2021-06-02 ENCOUNTER — LAB ENCOUNTER (OUTPATIENT)
Dept: LAB | Age: 66
End: 2021-06-02
Attending: PHYSICIAN ASSISTANT
Payer: OTHER GOVERNMENT

## 2021-06-02 DIAGNOSIS — E53.8 VITAMIN B12 DEFICIENCY: ICD-10-CM

## 2021-06-02 DIAGNOSIS — E55.9 VITAMIN D DEFICIENCY: ICD-10-CM

## 2021-06-02 DIAGNOSIS — Z13.29 THYROID DISORDER SCREEN: ICD-10-CM

## 2021-06-02 DIAGNOSIS — Z13.0 SCREENING FOR BLOOD DISEASE: ICD-10-CM

## 2021-06-02 DIAGNOSIS — E78.00 HIGH CHOLESTEROL: ICD-10-CM

## 2021-06-02 PROCEDURE — 80050 GENERAL HEALTH PANEL: CPT | Performed by: PHYSICIAN ASSISTANT

## 2021-06-02 PROCEDURE — 82607 VITAMIN B-12: CPT | Performed by: PHYSICIAN ASSISTANT

## 2021-06-02 PROCEDURE — 82306 VITAMIN D 25 HYDROXY: CPT | Performed by: PHYSICIAN ASSISTANT

## 2021-06-02 PROCEDURE — 80061 LIPID PANEL: CPT | Performed by: PHYSICIAN ASSISTANT

## 2021-06-30 ENCOUNTER — PATIENT MESSAGE (OUTPATIENT)
Dept: INTERNAL MEDICINE CLINIC | Facility: CLINIC | Age: 66
End: 2021-06-30

## 2021-06-30 ENCOUNTER — OFFICE VISIT (OUTPATIENT)
Dept: INTERNAL MEDICINE CLINIC | Facility: CLINIC | Age: 66
End: 2021-06-30
Payer: OTHER GOVERNMENT

## 2021-06-30 VITALS
BODY MASS INDEX: 37.56 KG/M2 | WEIGHT: 212 LBS | HEART RATE: 75 BPM | OXYGEN SATURATION: 96 % | DIASTOLIC BLOOD PRESSURE: 70 MMHG | SYSTOLIC BLOOD PRESSURE: 130 MMHG | HEIGHT: 63 IN | TEMPERATURE: 98 F

## 2021-06-30 DIAGNOSIS — Z78.0 POST-MENOPAUSE: ICD-10-CM

## 2021-06-30 DIAGNOSIS — E78.00 HIGH CHOLESTEROL: ICD-10-CM

## 2021-06-30 DIAGNOSIS — N95.2 VAGINAL ATROPHY: ICD-10-CM

## 2021-06-30 DIAGNOSIS — E53.8 VITAMIN B12 DEFICIENCY: ICD-10-CM

## 2021-06-30 DIAGNOSIS — Z00.00 ANNUAL PHYSICAL EXAM: Primary | ICD-10-CM

## 2021-06-30 DIAGNOSIS — R73.03 PREDIABETES: ICD-10-CM

## 2021-06-30 DIAGNOSIS — R23.2 HOT FLASHES: ICD-10-CM

## 2021-06-30 PROCEDURE — 3075F SYST BP GE 130 - 139MM HG: CPT | Performed by: PHYSICIAN ASSISTANT

## 2021-06-30 PROCEDURE — 3078F DIAST BP <80 MM HG: CPT | Performed by: PHYSICIAN ASSISTANT

## 2021-06-30 PROCEDURE — 3008F BODY MASS INDEX DOCD: CPT | Performed by: PHYSICIAN ASSISTANT

## 2021-06-30 PROCEDURE — 99396 PREV VISIT EST AGE 40-64: CPT | Performed by: PHYSICIAN ASSISTANT

## 2021-06-30 RX ORDER — VENLAFAXINE HYDROCHLORIDE 37.5 MG/1
CAPSULE, EXTENDED RELEASE ORAL
Qty: 60 CAPSULE | Refills: 0 | Status: SHIPPED | OUTPATIENT
Start: 2021-06-30 | End: 2021-09-17

## 2021-06-30 NOTE — PROGRESS NOTES
Patient presents with:  Physical: LM rm 6       HPI:  Pt presents for annual physical.      Hot flashes - Has been happening for years. Worse in the summer. Has never tried treatment. Would like to consider treatment at this time.       High chol - Compl nondisplaced fracture of base of fifth metacarpal bone of left hand, subsequent encounter     Vitamin B12 deficiency     Class 2 obesity due to excess calories without serious comorbidity with body mass index (BMI) of 35.0 to 35.9 in adult     Family histo GALLBLADDER     • TONSILLECTOMY       Family History   Problem Relation Age of Onset   • Cancer Father         colon   • Heart Disorder Father    • Other (bunions) Father    • Colon Cancer Father         Age 72   • Other (Other) Mother         [employer] TRI PRESERV FREE SINGLE DOSE (45870) FLU CLINIC                          01/06/2014      Covid-19 Vaccine Pfizer 30 mcg/0.3 ml                          04/12/2021 05/03/2021      FLU VAC QIV SPLIT 3 YRS AND OLDER (05144)                          09/29/201 Pulmonary/Chest: Effort normal and breath sounds normal. No respiratory distress. No wheezes, rhonchi or rales  Abdominal: Soft. Bowel sounds are normal. Non tender, no masses, no organomegaly or hernias. Musculoskeletal: Normal range of motion.  No roel 06/02/2021 99  50 - 130 U/L Final   • Bilirubin, Total 06/02/2021 0.9  0.1 - 2.0 mg/dL Final   • Total Protein 06/02/2021 6.9  6.4 - 8.2 g/dL Final   • Albumin 06/02/2021 3.6  3.4 - 5.0 g/dL Final   • Globulin  06/02/2021 3.3  2.8 - 4.4 g/dL Final   • A/G - On vaginal estrogen with only occasional use but sxs are controlled. Vitamin b12 deficiency - Add oral B12 1000 mcg PO daily. Recheck labs in 3 mos. Hot flashes - Try Effexor, titrate up to 75 mg daily. Discussed possible SEs.   Update me in 4 weeks b

## 2021-07-02 NOTE — TELEPHONE ENCOUNTER
Pt calling back, [facility] doesn't have RX, she is [facility], please advise?

## 2021-07-02 NOTE — TELEPHONE ENCOUNTER
From: Rhonda Fernandes PA-C  To: Annita Retana  Sent: 6/30/2021 8:42 AM CDT  Subject: B12    I neglected to talk to you about your B12 at your visit today. It is on the low side. I would like you to take Vit B12 1000 mcg by mouth daily.  We will repeat

## 2021-07-06 ENCOUNTER — HOSPITAL ENCOUNTER (OUTPATIENT)
Dept: BONE DENSITY | Age: 66
Discharge: HOME | End: 2021-07-06
Attending: PHYSICIAN ASSISTANT
Payer: OTHER GOVERNMENT

## 2021-07-06 DIAGNOSIS — Z78.0 POST-MENOPAUSE: ICD-10-CM

## 2021-07-06 PROCEDURE — 77080 DXA BONE DENSITY AXIAL: CPT | Performed by: PHYSICIAN ASSISTANT

## 2021-07-08 NOTE — PROGRESS NOTES
Calculated FRAX scores are 12% 10 year probability of major osteoporotic fx and 0.7% 10 year probability of hip fx. These do not warrant addition of medication per the guidelines though she does fall in osteopenia category now.   Recs are for adequate Calc

## 2021-07-15 ENCOUNTER — TELEPHONE (OUTPATIENT)
Dept: INTERNAL MEDICINE CLINIC | Facility: CLINIC | Age: 66
End: 2021-07-15

## 2021-07-15 NOTE — TELEPHONE ENCOUNTER
Pt has question regarding below prescribed medication would like call back to discuss    Venlafaxine HCl ER 37.5 MG Oral Capsule SR 24 Hr 60 capsule

## 2021-07-20 DIAGNOSIS — E78.00 HIGH CHOLESTEROL: ICD-10-CM

## 2021-07-20 RX ORDER — ATORVASTATIN CALCIUM 10 MG/1
TABLET, FILM COATED ORAL
Qty: 90 TABLET | Refills: 1 | Status: SHIPPED | OUTPATIENT
Start: 2021-07-20 | End: 2022-01-17

## 2021-07-20 NOTE — TELEPHONE ENCOUNTER
LOV with CB 6/30/2021 patient reports having headaches and increase in hot flashes with this medication. Patient stopped medication as she was on vacation. Pt was also told to follow up after dexa completed.  Patient was aware she was suppose to call regard

## 2021-07-21 ENCOUNTER — TELEPHONE (OUTPATIENT)
Dept: INTERNAL MEDICINE CLINIC | Facility: CLINIC | Age: 66
End: 2021-07-21

## 2021-07-21 NOTE — TELEPHONE ENCOUNTER
Pt called to cx appt today w/CB as she was exposed to someone with covid-she stated she will call back to r/s at a later date.

## 2021-09-17 ENCOUNTER — OFFICE VISIT (OUTPATIENT)
Dept: INTERNAL MEDICINE CLINIC | Facility: CLINIC | Age: 66
End: 2021-09-17
Payer: OTHER GOVERNMENT

## 2021-09-17 ENCOUNTER — HOSPITAL ENCOUNTER (OUTPATIENT)
Dept: GENERAL RADIOLOGY | Age: 66
Discharge: HOME | End: 2021-09-17
Attending: FAMILY MEDICINE
Payer: OTHER GOVERNMENT

## 2021-09-17 VITALS
BODY MASS INDEX: 36.32 KG/M2 | TEMPERATURE: 97 F | DIASTOLIC BLOOD PRESSURE: 68 MMHG | WEIGHT: 205 LBS | SYSTOLIC BLOOD PRESSURE: 112 MMHG | HEART RATE: 74 BPM | HEIGHT: 63 IN | OXYGEN SATURATION: 98 % | RESPIRATION RATE: 16 BRPM

## 2021-09-17 DIAGNOSIS — M25.511 ACUTE PAIN OF RIGHT SHOULDER: ICD-10-CM

## 2021-09-17 DIAGNOSIS — M25.511 ACUTE PAIN OF RIGHT SHOULDER: Primary | ICD-10-CM

## 2021-09-17 DIAGNOSIS — M75.101 ROTATOR CUFF SYNDROME OF RIGHT SHOULDER: ICD-10-CM

## 2021-09-17 PROCEDURE — 3074F SYST BP LT 130 MM HG: CPT | Performed by: FAMILY MEDICINE

## 2021-09-17 PROCEDURE — 3008F BODY MASS INDEX DOCD: CPT | Performed by: FAMILY MEDICINE

## 2021-09-17 PROCEDURE — 73030 X-RAY EXAM OF SHOULDER: CPT | Performed by: FAMILY MEDICINE

## 2021-09-17 PROCEDURE — 3078F DIAST BP <80 MM HG: CPT | Performed by: FAMILY MEDICINE

## 2021-09-17 PROCEDURE — 99213 OFFICE O/P EST LOW 20 MIN: CPT | Performed by: FAMILY MEDICINE

## 2021-09-17 NOTE — PROGRESS NOTES
Johan Laguna  12/10/1955    Patient presents with:  Shoulder Pain: SN Rm 2; R shoulder x 2days, some improvement, denies trauma      HPI:   Johan Laguna is a 60year old female who presents for evaluation of 2 days of atraumatic right shoulder 2020   • Calculus of kidney    • Closed fracture of unspecified part of femur    • Fatty liver 12/19/2012   • Frequent UTI 2014   • High cholesterol    • Morbid obesity ([facility] 05/06/2019   • Night sweats 2003   • Osteoarthritis    • Visual impairment    • W 5/20/2015    Cysto Dr. Gisela Steinberg   • OTHER SURGICAL HISTORY  03/24/2018    Cysto, URS stone extraction, litho, RPG, stent placement Dr. Valderrama Expose   • REMOVAL GALLBLADDER     • TONSILLECTOMY        Family History   Problem Relation Age of Onset   • Cancer Father days of slowly improving atraumatic right shoulder pain likely secondary to rotator cuff syndrome. Discussed treatment options, at this time we will begin home physical therapy, topical anti-inflammatories, and activity modifications.   [facility]

## 2022-01-07 ENCOUNTER — TELEPHONE (OUTPATIENT)
Dept: INTERNAL MEDICINE CLINIC | Facility: CLINIC | Age: 67
End: 2022-01-07

## 2022-01-07 DIAGNOSIS — Z12.31 ENCOUNTER FOR SCREENING MAMMOGRAM FOR MALIGNANT NEOPLASM OF BREAST: Primary | ICD-10-CM

## 2022-01-07 NOTE — TELEPHONE ENCOUNTER
Jazzmine Garcia - FW: Order for Mammogram    FW: Order for Tammy Brady   Sent:   1:18 PM   To: HAILEY Nichols 35 Clinical Staff    Chelsea Sierra   MRN: [id number] : 12/10/1955   Pt Home: [phone]                Mess

## 2022-01-17 DIAGNOSIS — E78.00 HIGH CHOLESTEROL: ICD-10-CM

## 2022-01-17 RX ORDER — ATORVASTATIN CALCIUM 10 MG/1
TABLET, FILM COATED ORAL
Qty: 90 TABLET | Refills: 1 | Status: SHIPPED | OUTPATIENT
Start: 2022-01-17

## 2022-01-17 NOTE — TELEPHONE ENCOUNTER
Medication(s) to Refill:   Requested Prescriptions     Pending Prescriptions Disp Refills   • ATORVASTATIN 10 MG Oral Tab [Pharmacy Med Name: ATORVASTATIN TABS 10MG] 90 tablet 3     Sig: TAKE 1 TABLET DAILY       LOV: [id number] Pain powder, lotions, or creams on your breasts or underarms.  They leave a coating that may be picked up by the x-rays, thereby distorting the mammogram.    Wear a two piece outfit the day of the exam. This allows you to be more comfortable during the exam. mailed to:  [facility] Attn: Radiology Imaging Library  [address]: Radiology File Room [address] [facility], [address] [facility]     The statements below are provided to all patients r

## 2022-02-08 ENCOUNTER — HOSPITAL ENCOUNTER (OUTPATIENT)
Dept: MAMMOGRAPHY | Age: 67
Discharge: HOME | End: 2022-02-08
Attending: PHYSICIAN ASSISTANT
Payer: MEDICARE

## 2022-02-08 DIAGNOSIS — Z12.31 ENCOUNTER FOR SCREENING MAMMOGRAM FOR MALIGNANT NEOPLASM OF BREAST: ICD-10-CM

## 2022-02-08 PROCEDURE — 77067 SCR MAMMO BI INCL CAD: CPT | Performed by: PHYSICIAN ASSISTANT

## 2022-02-08 PROCEDURE — 77063 BREAST TOMOSYNTHESIS BI: CPT | Performed by: PHYSICIAN ASSISTANT

## 2022-06-06 ENCOUNTER — TELEPHONE (OUTPATIENT)
Dept: INTERNAL MEDICINE CLINIC | Facility: CLINIC | Age: 67
End: 2022-06-06

## 2022-06-06 NOTE — TELEPHONE ENCOUNTER
Spoke to pt. Pt said about 3 weeks ago, she had body aches/N/V/D for 4-5 days. She was certain she had covid, but took a rapid and PCR, both came back neg. Her sxs then improved. About a week later, she felt like she had indigestion and ended up vomiting and also had diarrhea. Sx lasted for about a day or 2 and then subsided. Sx came back this weekend. Pt unsure if this is still from the original incident. She still believes she had covid 3 weeks ago despite the tests. She is asking about an antibody test to find out. Informed pt it is recommended to come in for an eval to figure out next best steps. Scheduled pt for a 40 min with [facility] tomorrow. [facility], ok for appt?

## 2022-06-06 NOTE — TELEPHONE ENCOUNTER
For the last 3 weeks pt has been c/o every week she has a bought of vomiting and diarrhea for a day and then its gone. Had a rapid and PCR covid test 05/20/22 at a testing site and it both neg.      Please advise

## 2022-06-07 ENCOUNTER — OFFICE VISIT (OUTPATIENT)
Dept: INTERNAL MEDICINE CLINIC | Facility: CLINIC | Age: 67
End: 2022-06-07
Payer: MEDICARE

## 2022-06-07 VITALS
HEIGHT: 63 IN | RESPIRATION RATE: 18 BRPM | WEIGHT: 208.38 LBS | SYSTOLIC BLOOD PRESSURE: 138 MMHG | BODY MASS INDEX: 36.92 KG/M2 | DIASTOLIC BLOOD PRESSURE: 80 MMHG | HEART RATE: 70 BPM

## 2022-06-07 DIAGNOSIS — R19.7 DIARRHEA, UNSPECIFIED TYPE: ICD-10-CM

## 2022-06-07 DIAGNOSIS — B34.9 VIRAL SYNDROME: Primary | ICD-10-CM

## 2022-06-07 DIAGNOSIS — R11.2 NAUSEA AND VOMITING, UNSPECIFIED VOMITING TYPE: ICD-10-CM

## 2022-06-07 PROBLEM — D70.9 NEUTROPENIA, UNSPECIFIED (HCC): Status: ACTIVE | Noted: 2022-06-07

## 2022-06-07 PROBLEM — D70.9 NEUTROPENIA, UNSPECIFIED: Status: ACTIVE | Noted: 2022-06-07

## 2022-06-07 PROCEDURE — 99214 OFFICE O/P EST MOD 30 MIN: CPT | Performed by: FAMILY MEDICINE

## 2022-06-09 ENCOUNTER — LAB ENCOUNTER (OUTPATIENT)
Dept: LAB | Age: 67
End: 2022-06-09
Attending: PHYSICIAN ASSISTANT
Payer: MEDICARE

## 2022-06-09 DIAGNOSIS — R73.03 PREDIABETES: ICD-10-CM

## 2022-06-09 DIAGNOSIS — R19.7 DIARRHEA, UNSPECIFIED TYPE: ICD-10-CM

## 2022-06-09 DIAGNOSIS — E78.00 HIGH CHOLESTEROL: ICD-10-CM

## 2022-06-09 DIAGNOSIS — E53.8 VITAMIN B12 DEFICIENCY: ICD-10-CM

## 2022-06-09 DIAGNOSIS — R11.2 NAUSEA AND VOMITING, UNSPECIFIED VOMITING TYPE: ICD-10-CM

## 2022-06-09 LAB
ALBUMIN SERPL-MCNC: 3.6 G/DL (ref 3.4–5)
ALBUMIN/GLOB SERPL: 1.2 {RATIO} (ref 1–2)
ALP LIVER SERPL-CCNC: 102 U/L
ALT SERPL-CCNC: 42 U/L
ANION GAP SERPL CALC-SCNC: 3 MMOL/L (ref 0–18)
AST SERPL-CCNC: 26 U/L (ref 15–37)
BASOPHILS # BLD AUTO: 0.03 X10(3) UL (ref 0–0.2)
BASOPHILS NFR BLD AUTO: 0.4 %
BILIRUB SERPL-MCNC: 1.1 MG/DL (ref 0.1–2)
BUN BLD-MCNC: 8 MG/DL (ref 7–18)
CALCIUM BLD-MCNC: 8.8 MG/DL (ref 8.5–10.1)
CHLORIDE SERPL-SCNC: 108 MMOL/L (ref 98–112)
CHOLEST SERPL-MCNC: 170 MG/DL (ref ?–200)
CO2 SERPL-SCNC: 28 MMOL/L (ref 21–32)
CREAT BLD-MCNC: 0.62 MG/DL
EOSINOPHIL # BLD AUTO: 0.15 X10(3) UL (ref 0–0.7)
EOSINOPHIL NFR BLD AUTO: 2.1 %
ERYTHROCYTE [DISTWIDTH] IN BLOOD BY AUTOMATED COUNT: 12.5 %
EST. AVERAGE GLUCOSE BLD GHB EST-MCNC: 120 MG/DL (ref 68–126)
FASTING PATIENT LIPID ANSWER: YES
FASTING STATUS PATIENT QL REPORTED: YES
GLOBULIN PLAS-MCNC: 3.1 G/DL (ref 2.8–4.4)
GLUCOSE BLD-MCNC: 82 MG/DL (ref 70–99)
HBA1C MFR BLD: 5.8 % (ref ?–5.7)
HCT VFR BLD AUTO: 42.5 %
HDLC SERPL-MCNC: 62 MG/DL (ref 40–59)
HGB BLD-MCNC: 13.8 G/DL
IMM GRANULOCYTES # BLD AUTO: 0.02 X10(3) UL (ref 0–1)
IMM GRANULOCYTES NFR BLD: 0.3 %
LDLC SERPL CALC-MCNC: 93 MG/DL (ref ?–100)
LYMPHOCYTES # BLD AUTO: 1.74 X10(3) UL (ref 1–4)
LYMPHOCYTES NFR BLD AUTO: 24.1 %
MCH RBC QN AUTO: 30.9 PG (ref 26–34)
MCHC RBC AUTO-ENTMCNC: 32.5 G/DL (ref 31–37)
MCV RBC AUTO: 95.3 FL
MONOCYTES # BLD AUTO: 0.54 X10(3) UL (ref 0.1–1)
MONOCYTES NFR BLD AUTO: 7.5 %
NEUTROPHILS # BLD AUTO: 4.73 X10 (3) UL (ref 1.5–7.7)
NEUTROPHILS # BLD AUTO: 4.73 X10(3) UL (ref 1.5–7.7)
NEUTROPHILS NFR BLD AUTO: 65.6 %
NONHDLC SERPL-MCNC: 108 MG/DL (ref ?–130)
OSMOLALITY SERPL CALC.SUM OF ELEC: 285 MOSM/KG (ref 275–295)
PLATELET # BLD AUTO: 302 10(3)UL (ref 150–450)
POTASSIUM SERPL-SCNC: 4.5 MMOL/L (ref 3.5–5.1)
PROT SERPL-MCNC: 6.7 G/DL (ref 6.4–8.2)
RBC # BLD AUTO: 4.46 X10(6)UL
SODIUM SERPL-SCNC: 139 MMOL/L (ref 136–145)
TRIGL SERPL-MCNC: 79 MG/DL (ref 30–149)
VIT B12 SERPL-MCNC: 983 PG/ML (ref 193–986)
VLDLC SERPL CALC-MCNC: 13 MG/DL (ref 0–30)
WBC # BLD AUTO: 7.2 X10(3) UL (ref 4–11)

## 2022-06-09 PROCEDURE — 80053 COMPREHEN METABOLIC PANEL: CPT

## 2022-06-09 PROCEDURE — 36415 COLL VENOUS BLD VENIPUNCTURE: CPT

## 2022-06-09 PROCEDURE — 85025 COMPLETE CBC W/AUTO DIFF WBC: CPT

## 2022-06-09 PROCEDURE — 83036 HEMOGLOBIN GLYCOSYLATED A1C: CPT

## 2022-06-09 PROCEDURE — 82607 VITAMIN B-12: CPT

## 2022-06-09 PROCEDURE — 80061 LIPID PANEL: CPT

## 2022-06-09 NOTE — PROGRESS NOTES
A1c falls in the pre-diabetic range, up from last check. Chol improved with LDL now of 93. B12 is normal.  Pt due for CPE later this month, please remind her to schedule.

## 2022-06-13 ENCOUNTER — TELEPHONE (OUTPATIENT)
Dept: INTERNAL MEDICINE CLINIC | Facility: CLINIC | Age: 67
End: 2022-06-13

## 2022-07-06 ENCOUNTER — OFFICE VISIT (OUTPATIENT)
Dept: INTERNAL MEDICINE CLINIC | Facility: CLINIC | Age: 67
End: 2022-07-06
Payer: MEDICARE

## 2022-07-06 VITALS
BODY MASS INDEX: 36.86 KG/M2 | DIASTOLIC BLOOD PRESSURE: 84 MMHG | HEIGHT: 63 IN | SYSTOLIC BLOOD PRESSURE: 152 MMHG | WEIGHT: 208 LBS | TEMPERATURE: 98 F | HEART RATE: 76 BPM

## 2022-07-06 DIAGNOSIS — E66.09 CLASS 2 OBESITY DUE TO EXCESS CALORIES WITHOUT SERIOUS COMORBIDITY WITH BODY MASS INDEX (BMI) OF 35.0 TO 35.9 IN ADULT: ICD-10-CM

## 2022-07-06 DIAGNOSIS — Z86.010 HISTORY OF ADENOMATOUS POLYP OF COLON: ICD-10-CM

## 2022-07-06 DIAGNOSIS — E53.8 VITAMIN B12 DEFICIENCY: ICD-10-CM

## 2022-07-06 DIAGNOSIS — Z11.59 NEED FOR HEPATITIS C SCREENING TEST: ICD-10-CM

## 2022-07-06 DIAGNOSIS — K57.30 DIVERTICULOSIS OF COLON: ICD-10-CM

## 2022-07-06 DIAGNOSIS — N39.0 RECURRENT UTI: ICD-10-CM

## 2022-07-06 DIAGNOSIS — Z80.0 FAMILY HISTORY OF COLON CANCER IN FATHER: ICD-10-CM

## 2022-07-06 DIAGNOSIS — E78.00 HIGH CHOLESTEROL: ICD-10-CM

## 2022-07-06 DIAGNOSIS — R73.03 PRE-DIABETES: ICD-10-CM

## 2022-07-06 DIAGNOSIS — Z00.00 ENCOUNTER FOR ANNUAL HEALTH EXAMINATION: Primary | ICD-10-CM

## 2022-07-06 PROBLEM — D70.9 NEUTROPENIA, UNSPECIFIED: Status: RESOLVED | Noted: 2022-06-07 | Resolved: 2022-07-06

## 2022-07-06 PROBLEM — S62.347A CLOSED NONDISPLACED FRACTURE OF BASE OF FIFTH METACARPAL BONE OF LEFT HAND, INITIAL ENCOUNTER: Status: RESOLVED | Noted: 2019-04-29 | Resolved: 2022-07-06

## 2022-07-06 PROBLEM — D70.9 NEUTROPENIA, UNSPECIFIED (HCC): Status: RESOLVED | Noted: 2022-06-07 | Resolved: 2022-07-06

## 2022-07-06 PROCEDURE — 93000 ELECTROCARDIOGRAM COMPLETE: CPT | Performed by: PHYSICIAN ASSISTANT

## 2022-07-06 PROCEDURE — G0402 INITIAL PREVENTIVE EXAM: HCPCS | Performed by: PHYSICIAN ASSISTANT

## 2022-07-06 RX ORDER — GABAPENTIN 300 MG/1
300 CAPSULE ORAL NIGHTLY
Qty: 90 CAPSULE | Refills: 0 | Status: SHIPPED | OUTPATIENT
Start: 2022-07-06

## 2022-07-15 DIAGNOSIS — E78.00 HIGH CHOLESTEROL: ICD-10-CM

## 2022-07-15 RX ORDER — ATORVASTATIN CALCIUM 10 MG/1
TABLET, FILM COATED ORAL
Qty: 90 TABLET | Refills: 0 | Status: SHIPPED | OUTPATIENT
Start: 2022-07-15

## 2022-07-15 NOTE — TELEPHONE ENCOUNTER
PASSED per protocol, refill sent. Last PE: 7-6-2022-CB    High cholesterol - Controlled on recent labs, continue statin. Work on diet and wt loss. No future appointments.

## 2022-08-23 ENCOUNTER — OFFICE VISIT (OUTPATIENT)
Dept: INTERNAL MEDICINE CLINIC | Facility: CLINIC | Age: 67
End: 2022-08-23
Payer: MEDICARE

## 2022-08-23 VITALS
TEMPERATURE: 98 F | HEIGHT: 63 IN | OXYGEN SATURATION: 98 % | RESPIRATION RATE: 18 BRPM | BODY MASS INDEX: 36.86 KG/M2 | SYSTOLIC BLOOD PRESSURE: 142 MMHG | HEART RATE: 83 BPM | DIASTOLIC BLOOD PRESSURE: 80 MMHG | WEIGHT: 208 LBS

## 2022-08-23 DIAGNOSIS — I10 PRIMARY HYPERTENSION: Primary | ICD-10-CM

## 2022-08-23 PROCEDURE — 99213 OFFICE O/P EST LOW 20 MIN: CPT | Performed by: PHYSICIAN ASSISTANT

## 2022-08-23 RX ORDER — LOSARTAN POTASSIUM 50 MG/1
50 TABLET ORAL DAILY
Qty: 30 TABLET | Refills: 1 | Status: SHIPPED | OUTPATIENT
Start: 2022-08-23

## 2022-09-01 ENCOUNTER — TELEPHONE (OUTPATIENT)
Dept: INTERNAL MEDICINE CLINIC | Facility: CLINIC | Age: 67
End: 2022-09-01

## 2022-09-01 NOTE — TELEPHONE ENCOUNTER
Patient was put on Losartan 50mg on 8/23 and thinks she may have some side effects of the medication; Patient was just sitting reading and her hands fell asleep, then during the night she woke up a couple of times and her feet were asleep, one foot actually hurt.

## 2022-09-01 NOTE — TELEPHONE ENCOUNTER
Called and attempted to speak with pt regarding sxs. Pt's  answered phone and said that pt is not available. He will have pt call back tomorrow morning. Advised to be seen in UC or ER with any urgent sxs. CS, could this be SE of Losartan?

## 2022-09-02 RX ORDER — LISINOPRIL 10 MG/1
10 TABLET ORAL DAILY
Qty: 30 TABLET | Refills: 1 | Status: SHIPPED | OUTPATIENT
Start: 2022-09-02 | End: 2023-08-28

## 2022-09-02 NOTE — TELEPHONE ENCOUNTER
Pt returned call. Pt said that she has never experiencing this before starting med. She stated it is listed as one of the SE's on the paper they gave her with it and stated to contact your doctor if this occurs. Pt did not take med last night. BP this am was 164/81. BP was going down over the past few days on med. Was in the 130's/80's. But pt does not want to take Losartan as she believes this is what is causing her sxs. She is asking if CS recommends another med. Routing to  for review/recs. OV next week to discuss further?

## 2022-09-02 NOTE — TELEPHONE ENCOUNTER
I doubt this is due to the losartan but recommend ov regardless so we can evaluate her further. Agree with urgent care or ER for any alarming symptoms.

## 2022-09-02 NOTE — TELEPHONE ENCOUNTER
We can try changing to lisinopril if she would like. Med sent. See me next week for f/u. No future appointments.

## 2022-09-02 NOTE — TELEPHONE ENCOUNTER
Called and spoke to pt. Informed her we can try lisinopril. CS sent to pharmacy. Pt said that she hear this has a lot of SE'd. Asked what all SE's she can get. Discussed most common. Pt said that she feels like her BP is not that high so she does not want anything that will do more harm than good. Explained to pt that her BP is elevated. This puts stress on her kidneys and needs to be controlled. Pt said that she will try lisinopril. Advised to f/u next week. Scheduled with CS on 9/8/22. FYI to CS.

## 2022-09-08 ENCOUNTER — LAB ENCOUNTER (OUTPATIENT)
Dept: LAB | Age: 67
End: 2022-09-08
Attending: PHYSICIAN ASSISTANT
Payer: MEDICARE

## 2022-09-08 ENCOUNTER — OFFICE VISIT (OUTPATIENT)
Dept: INTERNAL MEDICINE CLINIC | Facility: CLINIC | Age: 67
End: 2022-09-08
Payer: MEDICARE

## 2022-09-08 VITALS
TEMPERATURE: 97 F | HEIGHT: 63 IN | BODY MASS INDEX: 37.32 KG/M2 | HEART RATE: 74 BPM | WEIGHT: 210.63 LBS | DIASTOLIC BLOOD PRESSURE: 70 MMHG | RESPIRATION RATE: 18 BRPM | SYSTOLIC BLOOD PRESSURE: 132 MMHG

## 2022-09-08 DIAGNOSIS — I10 PRIMARY HYPERTENSION: ICD-10-CM

## 2022-09-08 DIAGNOSIS — Z11.59 NEED FOR HEPATITIS C SCREENING TEST: ICD-10-CM

## 2022-09-08 DIAGNOSIS — I10 PRIMARY HYPERTENSION: Primary | ICD-10-CM

## 2022-09-08 LAB
ANION GAP SERPL CALC-SCNC: 7 MMOL/L (ref 0–18)
BUN BLD-MCNC: 13 MG/DL (ref 7–18)
BUN/CREAT SERPL: 18.8 (ref 10–20)
CALCIUM BLD-MCNC: 9.1 MG/DL (ref 8.5–10.1)
CHLORIDE SERPL-SCNC: 107 MMOL/L (ref 98–112)
CO2 SERPL-SCNC: 27 MMOL/L (ref 21–32)
CREAT BLD-MCNC: 0.69 MG/DL
FASTING STATUS PATIENT QL REPORTED: NO
GFR SERPLBLD BASED ON 1.73 SQ M-ARVRAT: 96 ML/MIN/1.73M2 (ref 60–?)
GLUCOSE BLD-MCNC: 81 MG/DL (ref 70–99)
HCV AB SERPL QL IA: NONREACTIVE
OSMOLALITY SERPL CALC.SUM OF ELEC: 291 MOSM/KG (ref 275–295)
POTASSIUM SERPL-SCNC: 4.5 MMOL/L (ref 3.5–5.1)
SODIUM SERPL-SCNC: 141 MMOL/L (ref 136–145)

## 2022-09-08 PROCEDURE — 80048 BASIC METABOLIC PNL TOTAL CA: CPT

## 2022-09-08 PROCEDURE — 86803 HEPATITIS C AB TEST: CPT

## 2022-09-08 PROCEDURE — 36415 COLL VENOUS BLD VENIPUNCTURE: CPT

## 2022-09-08 PROCEDURE — 99213 OFFICE O/P EST LOW 20 MIN: CPT | Performed by: PHYSICIAN ASSISTANT

## 2022-09-20 ENCOUNTER — TELEPHONE (OUTPATIENT)
Dept: INTERNAL MEDICINE CLINIC | Facility: CLINIC | Age: 67
End: 2022-09-20

## 2022-09-20 NOTE — TELEPHONE ENCOUNTER
Pt is going out of town on 9/24 and per pharm they cannot fill it until 9/26 and she needs to take the rx with her-can we call to ok refill sooner? lisinopril 10 MG Oral Tab-osco on [address]    Pt requesting us to [facility] in [facility]

## 2022-10-13 DIAGNOSIS — E78.00 HIGH CHOLESTEROL: ICD-10-CM

## 2022-10-13 RX ORDER — ATORVASTATIN CALCIUM 10 MG/1
TABLET, FILM COATED ORAL
Qty: 90 TABLET | Refills: 2 | Status: SHIPPED | OUTPATIENT
Start: 2022-10-13

## 2022-10-28 ENCOUNTER — LAB ENCOUNTER (OUTPATIENT)
Dept: LAB | Age: 67
End: 2022-10-28
Attending: FAMILY MEDICINE
Payer: MEDICARE

## 2022-10-28 ENCOUNTER — OFFICE VISIT (OUTPATIENT)
Dept: INTERNAL MEDICINE CLINIC | Facility: CLINIC | Age: 67
End: 2022-10-28
Payer: MEDICARE

## 2022-10-28 VITALS
WEIGHT: 209.38 LBS | BODY MASS INDEX: 37.1 KG/M2 | DIASTOLIC BLOOD PRESSURE: 82 MMHG | HEART RATE: 84 BPM | RESPIRATION RATE: 18 BRPM | OXYGEN SATURATION: 98 % | HEIGHT: 63 IN | SYSTOLIC BLOOD PRESSURE: 112 MMHG

## 2022-10-28 DIAGNOSIS — R20.2 NUMBNESS AND TINGLING OF UPPER AND LOWER EXTREMITIES OF BOTH SIDES: ICD-10-CM

## 2022-10-28 DIAGNOSIS — R20.2 NUMBNESS AND TINGLING OF UPPER AND LOWER EXTREMITIES OF BOTH SIDES: Primary | ICD-10-CM

## 2022-10-28 DIAGNOSIS — R20.0 NUMBNESS AND TINGLING OF UPPER AND LOWER EXTREMITIES OF BOTH SIDES: ICD-10-CM

## 2022-10-28 DIAGNOSIS — R20.0 NUMBNESS AND TINGLING OF UPPER AND LOWER EXTREMITIES OF BOTH SIDES: Primary | ICD-10-CM

## 2022-10-28 DIAGNOSIS — I10 PRIMARY HYPERTENSION: ICD-10-CM

## 2022-10-28 LAB — TSI SER-ACNC: 0.72 MIU/ML (ref 0.36–3.74)

## 2022-10-28 PROCEDURE — 90662 IIV NO PRSV INCREASED AG IM: CPT | Performed by: FAMILY MEDICINE

## 2022-10-28 PROCEDURE — G0008 ADMIN INFLUENZA VIRUS VAC: HCPCS | Performed by: FAMILY MEDICINE

## 2022-10-28 PROCEDURE — 84443 ASSAY THYROID STIM HORMONE: CPT

## 2022-10-28 PROCEDURE — 36415 COLL VENOUS BLD VENIPUNCTURE: CPT

## 2022-10-28 PROCEDURE — 99214 OFFICE O/P EST MOD 30 MIN: CPT | Performed by: FAMILY MEDICINE

## 2022-12-11 ENCOUNTER — OFFICE VISIT (OUTPATIENT)
Dept: FAMILY MEDICINE CLINIC | Facility: CLINIC | Age: 67
End: 2022-12-11
Payer: MEDICARE

## 2022-12-11 VITALS
SYSTOLIC BLOOD PRESSURE: 134 MMHG | HEIGHT: 63 IN | BODY MASS INDEX: 37.63 KG/M2 | HEART RATE: 83 BPM | RESPIRATION RATE: 18 BRPM | OXYGEN SATURATION: 96 % | WEIGHT: 212.38 LBS | DIASTOLIC BLOOD PRESSURE: 67 MMHG

## 2022-12-11 DIAGNOSIS — R39.9 UTI SYMPTOMS: Primary | ICD-10-CM

## 2022-12-11 PROCEDURE — 87086 URINE CULTURE/COLONY COUNT: CPT | Performed by: NURSE PRACTITIONER

## 2022-12-11 PROCEDURE — 87077 CULTURE AEROBIC IDENTIFY: CPT | Performed by: NURSE PRACTITIONER

## 2022-12-11 PROCEDURE — 99213 OFFICE O/P EST LOW 20 MIN: CPT | Performed by: NURSE PRACTITIONER

## 2022-12-11 PROCEDURE — 87186 SC STD MICRODIL/AGAR DIL: CPT | Performed by: NURSE PRACTITIONER

## 2022-12-11 RX ORDER — NITROFURANTOIN 25; 75 MG/1; MG/1
100 CAPSULE ORAL 2 TIMES DAILY
Qty: 14 CAPSULE | Refills: 0 | Status: SHIPPED | OUTPATIENT
Start: 2022-12-11 | End: 2022-12-18

## 2022-12-20 ENCOUNTER — OFFICE VISIT (OUTPATIENT)
Dept: FAMILY MEDICINE CLINIC | Facility: CLINIC | Age: 67
End: 2022-12-20
Payer: MEDICARE

## 2022-12-20 VITALS
WEIGHT: 205 LBS | SYSTOLIC BLOOD PRESSURE: 120 MMHG | HEIGHT: 63 IN | TEMPERATURE: 98 F | HEART RATE: 75 BPM | DIASTOLIC BLOOD PRESSURE: 78 MMHG | BODY MASS INDEX: 36.32 KG/M2 | OXYGEN SATURATION: 98 % | RESPIRATION RATE: 18 BRPM

## 2022-12-20 DIAGNOSIS — R39.9 UTI SYMPTOMS: Primary | ICD-10-CM

## 2022-12-20 LAB
APPEARANCE: CLEAR
BILIRUBIN: NEGATIVE
GLUCOSE (URINE DIPSTICK): NEGATIVE MG/DL
KETONES (URINE DIPSTICK): NEGATIVE MG/DL
LEUKOCYTES: NEGATIVE
MULTISTIX LOT#: ABNORMAL NUMERIC
NITRITE, URINE: NEGATIVE
PH, URINE: 6 (ref 4.5–8)
SPECIFIC GRAVITY: 1.03 (ref 1–1.03)
URINE-COLOR: YELLOW
UROBILINOGEN,SEMI-QN: 0.2 MG/DL (ref 0–1.9)

## 2022-12-20 PROCEDURE — 87086 URINE CULTURE/COLONY COUNT: CPT | Performed by: NURSE PRACTITIONER

## 2022-12-20 PROCEDURE — 87077 CULTURE AEROBIC IDENTIFY: CPT | Performed by: NURSE PRACTITIONER

## 2022-12-20 PROCEDURE — 87186 SC STD MICRODIL/AGAR DIL: CPT | Performed by: NURSE PRACTITIONER

## 2022-12-20 PROCEDURE — 99213 OFFICE O/P EST LOW 20 MIN: CPT | Performed by: NURSE PRACTITIONER

## 2022-12-20 PROCEDURE — 81003 URINALYSIS AUTO W/O SCOPE: CPT | Performed by: NURSE PRACTITIONER

## 2022-12-21 ENCOUNTER — TELEPHONE (OUTPATIENT)
Dept: FAMILY MEDICINE CLINIC | Facility: CLINIC | Age: 67
End: 2022-12-21

## 2022-12-21 DIAGNOSIS — N39.0 URINARY TRACT INFECTION WITHOUT HEMATURIA, SITE UNSPECIFIED: Primary | ICD-10-CM

## 2022-12-21 RX ORDER — NITROFURANTOIN 25; 75 MG/1; MG/1
100 CAPSULE ORAL 2 TIMES DAILY
Qty: 20 CAPSULE | Refills: 0 | Status: SHIPPED | OUTPATIENT
Start: 2022-12-21 | End: 2022-12-31

## 2022-12-21 NOTE — TELEPHONE ENCOUNTER
Prelim positive for bacteria. Will send macrobid 100mg bid x 10 days. F/u with PCP as needed. Await final culture.

## 2023-01-04 ENCOUNTER — OFFICE VISIT (OUTPATIENT)
Dept: FAMILY MEDICINE CLINIC | Facility: CLINIC | Age: 68
End: 2023-01-04
Payer: MEDICARE

## 2023-01-04 VITALS
WEIGHT: 203 LBS | HEIGHT: 64 IN | BODY MASS INDEX: 34.66 KG/M2 | OXYGEN SATURATION: 99 % | SYSTOLIC BLOOD PRESSURE: 138 MMHG | DIASTOLIC BLOOD PRESSURE: 76 MMHG | RESPIRATION RATE: 18 BRPM | TEMPERATURE: 98 F | HEART RATE: 86 BPM

## 2023-01-04 DIAGNOSIS — H10.33 ACUTE CONJUNCTIVITIS OF BOTH EYES, UNSPECIFIED ACUTE CONJUNCTIVITIS TYPE: Primary | ICD-10-CM

## 2023-01-04 PROCEDURE — 99213 OFFICE O/P EST LOW 20 MIN: CPT | Performed by: NURSE PRACTITIONER

## 2023-01-04 RX ORDER — POLYMYXIN B SULFATE AND TRIMETHOPRIM 1; 10000 MG/ML; [USP'U]/ML
1 SOLUTION OPHTHALMIC EVERY 4 HOURS
Qty: 1 EACH | Refills: 0 | Status: SHIPPED | OUTPATIENT
Start: 2023-01-04 | End: 2023-01-11

## 2023-02-21 ENCOUNTER — OFFICE VISIT (OUTPATIENT)
Dept: NEUROLOGY | Facility: CLINIC | Age: 68
End: 2023-02-21
Payer: MEDICARE

## 2023-02-21 VITALS
HEART RATE: 67 BPM | WEIGHT: 202 LBS | DIASTOLIC BLOOD PRESSURE: 68 MMHG | SYSTOLIC BLOOD PRESSURE: 121 MMHG | BODY MASS INDEX: 35 KG/M2

## 2023-02-21 DIAGNOSIS — R20.9 DISTURBANCE OF SKIN SENSATION: Primary | ICD-10-CM

## 2023-02-21 PROCEDURE — 99204 OFFICE O/P NEW MOD 45 MIN: CPT | Performed by: OTHER

## 2023-02-21 NOTE — PROGRESS NOTES
Pt reports she has numbness and in hands and feet that occurs at night. She reports these symptoms have been present for the last 6 months.

## 2023-03-27 RX ORDER — LISINOPRIL 10 MG/1
TABLET ORAL
Qty: 90 TABLET | Refills: 3 | Status: SHIPPED | OUTPATIENT
Start: 2023-03-27

## 2023-06-19 ENCOUNTER — TELEPHONE (OUTPATIENT)
Dept: INTERNAL MEDICINE CLINIC | Facility: CLINIC | Age: 68
End: 2023-06-19

## 2023-06-19 DIAGNOSIS — I10 PRIMARY HYPERTENSION: ICD-10-CM

## 2023-06-19 DIAGNOSIS — E78.00 HIGH CHOLESTEROL: ICD-10-CM

## 2023-06-19 DIAGNOSIS — R73.03 PRE-DIABETES: ICD-10-CM

## 2023-06-19 DIAGNOSIS — Z00.00 ROUTINE GENERAL MEDICAL EXAMINATION AT A HEALTH CARE FACILITY: Primary | ICD-10-CM

## 2023-06-19 DIAGNOSIS — Z12.31 ENCOUNTER FOR SCREENING MAMMOGRAM FOR MALIGNANT NEOPLASM OF BREAST: Primary | ICD-10-CM

## 2023-06-19 NOTE — TELEPHONE ENCOUNTER
Orders to    Himanshu Howell         Pt aware to get labs done no sooner than 2 weeks prior to the appt. Pt aware to fast.  No call back required.     Future Appointments   Date Time Provider Maximino Loyola   7/19/2023  8:00 AM Vicenta Michel PA-C [facility]

## 2023-06-19 NOTE — TELEPHONE ENCOUNTER
Pt was due for her mammo in Feb and is hoping to get an order for itl.   Pt scheduled on below for cpe  Future Appointments   Date Time Provider Maximino Loyola   7/19/2023  8:00 AM Yue Dover PA-C [facility]

## 2023-06-21 DIAGNOSIS — E78.00 HIGH CHOLESTEROL: ICD-10-CM

## 2023-06-22 RX ORDER — ATORVASTATIN CALCIUM 10 MG/1
TABLET, FILM COATED ORAL
Qty: 90 TABLET | Refills: 0 | Status: SHIPPED | OUTPATIENT
Start: 2023-06-22

## 2023-06-22 NOTE — TELEPHONE ENCOUNTER
Cholesterol Medication Protocol Failed 06/21/2023 10:32 AM   Protocol Details  ALT < 80    ALT resulted within past year    Lipid panel within past 12 months    Appointment within past 12 or next 3 months     Pt due now for labs. Orders in, just not completed yet.      appt 7/19/23 with [facility]

## 2023-07-07 ENCOUNTER — HOSPITAL ENCOUNTER (OUTPATIENT)
Dept: MAMMOGRAPHY | Age: 68
Discharge: HOME | End: 2023-07-07
Attending: NURSE PRACTITIONER
Payer: MEDICARE

## 2023-07-07 DIAGNOSIS — Z12.31 ENCOUNTER FOR SCREENING MAMMOGRAM FOR MALIGNANT NEOPLASM OF BREAST: ICD-10-CM

## 2023-07-07 PROCEDURE — 77067 SCR MAMMO BI INCL CAD: CPT | Performed by: NURSE PRACTITIONER

## 2023-07-07 PROCEDURE — 77063 BREAST TOMOSYNTHESIS BI: CPT | Performed by: NURSE PRACTITIONER

## 2023-07-17 ENCOUNTER — LAB ENCOUNTER (OUTPATIENT)
Dept: LAB | Age: 68
End: 2023-07-17
Attending: PHYSICIAN ASSISTANT
Payer: MEDICARE

## 2023-07-17 DIAGNOSIS — E78.00 HIGH CHOLESTEROL: ICD-10-CM

## 2023-07-17 DIAGNOSIS — Z00.00 ROUTINE GENERAL MEDICAL EXAMINATION AT A HEALTH CARE FACILITY: ICD-10-CM

## 2023-07-17 DIAGNOSIS — R73.03 PRE-DIABETES: ICD-10-CM

## 2023-07-17 DIAGNOSIS — I10 PRIMARY HYPERTENSION: ICD-10-CM

## 2023-07-17 LAB
ALBUMIN SERPL-MCNC: 3.4 G/DL (ref 3.4–5)
ALBUMIN/GLOB SERPL: 1.1 {RATIO} (ref 1–2)
ALP LIVER SERPL-CCNC: 84 U/L
ALT SERPL-CCNC: 29 U/L
ANION GAP SERPL CALC-SCNC: 2 MMOL/L (ref 0–18)
AST SERPL-CCNC: 18 U/L (ref 15–37)
BASOPHILS # BLD AUTO: 0.04 X10(3) UL (ref 0–0.2)
BASOPHILS NFR BLD AUTO: 0.6 %
BILIRUB SERPL-MCNC: 0.8 MG/DL (ref 0.1–2)
BUN BLD-MCNC: 13 MG/DL (ref 7–18)
CALCIUM BLD-MCNC: 9 MG/DL (ref 8.5–10.1)
CHLORIDE SERPL-SCNC: 110 MMOL/L (ref 98–112)
CHOLEST SERPL-MCNC: 173 MG/DL (ref ?–200)
CO2 SERPL-SCNC: 28 MMOL/L (ref 21–32)
CREAT BLD-MCNC: 0.68 MG/DL
EOSINOPHIL # BLD AUTO: 0.22 X10(3) UL (ref 0–0.7)
EOSINOPHIL NFR BLD AUTO: 3.4 %
ERYTHROCYTE [DISTWIDTH] IN BLOOD BY AUTOMATED COUNT: 12.3 %
EST. AVERAGE GLUCOSE BLD GHB EST-MCNC: 126 MG/DL (ref 68–126)
FASTING PATIENT LIPID ANSWER: YES
FASTING STATUS PATIENT QL REPORTED: YES
GFR SERPLBLD BASED ON 1.73 SQ M-ARVRAT: 96 ML/MIN/1.73M2 (ref 60–?)
GLOBULIN PLAS-MCNC: 3 G/DL (ref 2.8–4.4)
GLUCOSE BLD-MCNC: 98 MG/DL (ref 70–99)
HBA1C MFR BLD: 6 % (ref ?–5.7)
HCT VFR BLD AUTO: 42 %
HDLC SERPL-MCNC: 72 MG/DL (ref 40–59)
HGB BLD-MCNC: 13.9 G/DL
IMM GRANULOCYTES # BLD AUTO: 0.01 X10(3) UL (ref 0–1)
IMM GRANULOCYTES NFR BLD: 0.2 %
LDLC SERPL CALC-MCNC: 88 MG/DL (ref ?–100)
LYMPHOCYTES # BLD AUTO: 1.79 X10(3) UL (ref 1–4)
LYMPHOCYTES NFR BLD AUTO: 27.6 %
MCH RBC QN AUTO: 31.4 PG (ref 26–34)
MCHC RBC AUTO-ENTMCNC: 33.1 G/DL (ref 31–37)
MCV RBC AUTO: 95 FL
MONOCYTES # BLD AUTO: 0.49 X10(3) UL (ref 0.1–1)
MONOCYTES NFR BLD AUTO: 7.6 %
NEUTROPHILS # BLD AUTO: 3.93 X10 (3) UL (ref 1.5–7.7)
NEUTROPHILS # BLD AUTO: 3.93 X10(3) UL (ref 1.5–7.7)
NEUTROPHILS NFR BLD AUTO: 60.6 %
NONHDLC SERPL-MCNC: 101 MG/DL (ref ?–130)
OSMOLALITY SERPL CALC.SUM OF ELEC: 290 MOSM/KG (ref 275–295)
PLATELET # BLD AUTO: 294 10(3)UL (ref 150–450)
POTASSIUM SERPL-SCNC: 4.4 MMOL/L (ref 3.5–5.1)
PROT SERPL-MCNC: 6.4 G/DL (ref 6.4–8.2)
RBC # BLD AUTO: 4.42 X10(6)UL
SODIUM SERPL-SCNC: 140 MMOL/L (ref 136–145)
TRIGL SERPL-MCNC: 67 MG/DL (ref 30–149)
TSI SER-ACNC: 0.65 MIU/ML (ref 0.36–3.74)
VLDLC SERPL CALC-MCNC: 11 MG/DL (ref 0–30)
WBC # BLD AUTO: 6.5 X10(3) UL (ref 4–11)

## 2023-07-17 PROCEDURE — 85025 COMPLETE CBC W/AUTO DIFF WBC: CPT

## 2023-07-17 PROCEDURE — 83036 HEMOGLOBIN GLYCOSYLATED A1C: CPT

## 2023-07-17 PROCEDURE — 84443 ASSAY THYROID STIM HORMONE: CPT

## 2023-07-17 PROCEDURE — 36415 COLL VENOUS BLD VENIPUNCTURE: CPT

## 2023-07-17 PROCEDURE — 80053 COMPREHEN METABOLIC PANEL: CPT

## 2023-07-17 PROCEDURE — 80061 LIPID PANEL: CPT

## 2023-07-19 ENCOUNTER — OFFICE VISIT (OUTPATIENT)
Dept: INTERNAL MEDICINE CLINIC | Facility: CLINIC | Age: 68
End: 2023-07-19
Payer: MEDICARE

## 2023-07-19 VITALS
SYSTOLIC BLOOD PRESSURE: 130 MMHG | RESPIRATION RATE: 20 BRPM | HEIGHT: 64 IN | OXYGEN SATURATION: 95 % | DIASTOLIC BLOOD PRESSURE: 68 MMHG | BODY MASS INDEX: 35.03 KG/M2 | WEIGHT: 205.19 LBS | HEART RATE: 79 BPM

## 2023-07-19 DIAGNOSIS — E55.9 VITAMIN D DEFICIENCY: ICD-10-CM

## 2023-07-19 DIAGNOSIS — M51.26 HNP (HERNIATED NUCLEUS PULPOSUS), LUMBAR: ICD-10-CM

## 2023-07-19 DIAGNOSIS — Z00.00 ENCOUNTER FOR ANNUAL HEALTH EXAMINATION: Primary | ICD-10-CM

## 2023-07-19 DIAGNOSIS — G89.29 CHRONIC NECK PAIN: ICD-10-CM

## 2023-07-19 DIAGNOSIS — E66.09 CLASS 2 OBESITY DUE TO EXCESS CALORIES WITHOUT SERIOUS COMORBIDITY WITH BODY MASS INDEX (BMI) OF 35.0 TO 35.9 IN ADULT: ICD-10-CM

## 2023-07-19 DIAGNOSIS — G56.03 BILATERAL CARPAL TUNNEL SYNDROME: ICD-10-CM

## 2023-07-19 DIAGNOSIS — R20.9 DISTURBANCE OF SKIN SENSATION: ICD-10-CM

## 2023-07-19 DIAGNOSIS — M54.16 LUMBAR RADICULOPATHY: ICD-10-CM

## 2023-07-19 DIAGNOSIS — G89.29 CHRONIC RIGHT-SIDED LOW BACK PAIN WITHOUT SCIATICA: ICD-10-CM

## 2023-07-19 DIAGNOSIS — R73.03 PRE-DIABETES: ICD-10-CM

## 2023-07-19 DIAGNOSIS — I10 PRIMARY HYPERTENSION: ICD-10-CM

## 2023-07-19 DIAGNOSIS — E78.00 HIGH CHOLESTEROL: ICD-10-CM

## 2023-07-19 DIAGNOSIS — K80.20 GALLSTONES: ICD-10-CM

## 2023-07-19 DIAGNOSIS — K57.30 DIVERTICULOSIS OF COLON: ICD-10-CM

## 2023-07-19 DIAGNOSIS — Z80.0 FAMILY HISTORY OF COLON CANCER IN FATHER: ICD-10-CM

## 2023-07-19 DIAGNOSIS — Z86.010 HISTORY OF ADENOMATOUS POLYP OF COLON: ICD-10-CM

## 2023-07-19 DIAGNOSIS — N20.0 CALCULUS OF BOTH KIDNEYS: ICD-10-CM

## 2023-07-19 DIAGNOSIS — K64.8 INTERNAL HEMORRHOIDS: ICD-10-CM

## 2023-07-19 DIAGNOSIS — M54.2 CHRONIC NECK PAIN: ICD-10-CM

## 2023-07-19 DIAGNOSIS — N95.2 VAGINAL ATROPHY: ICD-10-CM

## 2023-07-19 DIAGNOSIS — E53.8 VITAMIN B12 DEFICIENCY: ICD-10-CM

## 2023-07-19 DIAGNOSIS — M54.50 CHRONIC RIGHT-SIDED LOW BACK PAIN WITHOUT SCIATICA: ICD-10-CM

## 2023-07-19 PROCEDURE — 1125F AMNT PAIN NOTED PAIN PRSNT: CPT | Performed by: PHYSICIAN ASSISTANT

## 2023-07-19 PROCEDURE — 99213 OFFICE O/P EST LOW 20 MIN: CPT | Performed by: PHYSICIAN ASSISTANT

## 2023-07-19 PROCEDURE — G0438 PPPS, INITIAL VISIT: HCPCS | Performed by: PHYSICIAN ASSISTANT

## 2023-09-06 DIAGNOSIS — E78.00 HIGH CHOLESTEROL: ICD-10-CM

## 2023-09-07 RX ORDER — ATORVASTATIN CALCIUM 10 MG/1
TABLET, FILM COATED ORAL
Qty: 90 TABLET | Refills: 3 | Status: SHIPPED | OUTPATIENT
Start: 2023-09-07

## 2023-09-07 NOTE — TELEPHONE ENCOUNTER
Cholesterol Medication Protocol [id number]/06/2023 09:47 AM   Protocol Details ALT < 80    ALT resulted within past year    Lipid panel within past 12 months    Appointment within past 12 or next 3 months          Last annual 7/19/23    Last lipid 7/17/23

## 2023-09-08 ENCOUNTER — OFFICE VISIT (OUTPATIENT)
Dept: PHYSICAL THERAPY | Facility: HOSPITAL | Age: 68
End: 2023-09-08
Attending: PHYSICIAN ASSISTANT
Payer: MEDICARE

## 2023-09-08 DIAGNOSIS — M54.50 CHRONIC RIGHT-SIDED LOW BACK PAIN WITHOUT SCIATICA: Primary | ICD-10-CM

## 2023-09-08 DIAGNOSIS — G89.29 CHRONIC RIGHT-SIDED LOW BACK PAIN WITHOUT SCIATICA: Primary | ICD-10-CM

## 2023-09-08 DIAGNOSIS — M54.2 CHRONIC NECK PAIN: ICD-10-CM

## 2023-09-08 DIAGNOSIS — G89.29 CHRONIC NECK PAIN: ICD-10-CM

## 2023-09-08 PROCEDURE — 97162 PT EVAL MOD COMPLEX 30 MIN: CPT

## 2023-09-08 PROCEDURE — 97110 THERAPEUTIC EXERCISES: CPT

## 2023-09-11 ENCOUNTER — OFFICE VISIT (OUTPATIENT)
Dept: PHYSICAL THERAPY | Facility: HOSPITAL | Age: 68
End: 2023-09-11
Attending: PHYSICIAN ASSISTANT
Payer: MEDICARE

## 2023-09-11 PROCEDURE — 97110 THERAPEUTIC EXERCISES: CPT

## 2023-09-11 PROCEDURE — 97140 MANUAL THERAPY 1/> REGIONS: CPT

## 2023-09-11 NOTE — PROGRESS NOTES
Diagnosis:   Associated DX:  Chronic right-sided low back pain without sciatica (M54.50,G89.29)  Chronic neck pain (M54.2,G89.2   Referring Provider: Wendy Quinn  Date of Evaluation:    9/8/23    Precautions:  None Next MD visit:   none scheduled  Date of Surgery: n/a   Insurance Primary/Secondary: MEDICARE / [employer]     # Auth Visits: [id number]            Subjective: R christie-scapular tingling ranges from 0-2/10. Sore, aching R > L UT/LS area is rated 3/10 this afternoon. Notes numbness, tingling B hands and feet each night. Is using ice prn. Pain: 0-3/10    Objective: see treatment flow sheet. Assessment: HEP was upgraded to include UE and LE nerve glides. Tolerated manual therapy and exercises well. Goals:  (to be met in 10 visits)   Pt will report sleeping at least 5-6 hours undisturbed by increased pain. Pt will report improved tolerance to lifting/carrying things. Pt will report improved cervical ROM while driving. Pt will be independent with an upgraded HEP. Plan: as noted in initial evaluation. .. Date: 9/11/2023  TX#: 2/10 Date:                 TX#: 3/ Date:                 TX#: 4/ Date:                 TX#: 5/ Date: Tx#: 6/   NuStep (8/9) load 5 for 5 minutes. DKTC with heels on SB x 20 reps. Ulis Lipoma LTR with calves on SB x 20 l/r. .       Bridging with calves on SB 3 second holds x 10 reps. Luis Lipoma Hook lying HS stretch with ankle pumps x 10 reps each let. Median nerve glides by PT 2 x 10 each arm. Supine manual cervical traction by PT. Stm c-t area by PT while supine and while left side lying. Cervical PROM by Pt while supine. Seated sciatic tensioners x 10 l/r. .       Supine scalene stretching by PT. Supine pectoralis stretching by PT.        HEP: B SR, LTR hook lying, B pectoralis stretch, cervical rotation, sciatic nerve glides, median nerve glides. Charges: [employer].        Total Timed Treatment: 42 min  Total Treatment Time: 42 min

## 2023-09-15 ENCOUNTER — OFFICE VISIT (OUTPATIENT)
Dept: PHYSICAL THERAPY | Facility: HOSPITAL | Age: 68
End: 2023-09-15
Attending: PHYSICIAN ASSISTANT
Payer: MEDICARE

## 2023-09-15 PROCEDURE — 97110 THERAPEUTIC EXERCISES: CPT

## 2023-09-15 PROCEDURE — 97140 MANUAL THERAPY 1/> REGIONS: CPT

## 2023-09-20 ENCOUNTER — OFFICE VISIT (OUTPATIENT)
Dept: PHYSICAL THERAPY | Facility: HOSPITAL | Age: 68
End: 2023-09-20
Attending: PHYSICIAN ASSISTANT
Payer: MEDICARE

## 2023-09-20 PROCEDURE — 97140 MANUAL THERAPY 1/> REGIONS: CPT

## 2023-09-20 PROCEDURE — 97110 THERAPEUTIC EXERCISES: CPT

## 2023-09-20 NOTE — PROGRESS NOTES
Diagnosis:   Associated DX:  Chronic right-sided low back pain without sciatica (M54.50,G89.29)  Chronic neck pain (M54.2,G89.2   Referring Provider: Hanna Montaño  Date of Evaluation:    9/8/23    Precautions:  None Next MD visit:   none scheduled  Date of Surgery: n/a   Insurance Primary/Secondary: MEDICARE /      # Auth Visits: [id number]            Subjective: R christie-scapular  soreness is rated 3/10. Sore, aching R > L UT/LS area is rated 3/10. Notes numbness, tingling B hands and feet each night, but a little less frequently. Is using ice prn and heat prn. .. Pain:   3/10    Objective: see treatment flow sheet. Assessment: Tolerated manual therapy and exercises well. Goals:  (to be met in 10 visits)   Pt will report sleeping at least 5-6 hours undisturbed by increased pain. Pt will report improved tolerance to lifting/carrying things. Pt will report improved cervical ROM while driving. Pt will be independent with an upgraded HEP. Plan: as noted in initial evaluation. .. Date: 9/11/2023  TX#: 2/10 Date:  9/15/23               TX#: 3/10 Date:  9/20/23               TX#: 4/10 Date:                 TX#: 5/ Date: Tx#: 6/   NuStep (8/9) load 5 for 5 minutes. Load 5 for 5 minutes. NuStep (8/9) load 5 for 5 minutes      DKTC with heels on SB x 20 reps. . X 20 reps. . DKTC with heels on SB x 20 reps. LTR with calves on SB x 20 l/r. . X 20 l/r. [place] Fallen LTR with calves on SB x 20 l/r. .     Bridging with calves on SB 3 second holds x 10 reps. . 2 x 8.  3 x 8. Hook lying HS stretch with ankle pumps x 10 reps each let. X 15 reps each leg. X 15 reps each leg. Median nerve glides by PT 2 x 10 each arm. 3 x 10 each arm by PT. X.     Supine manual cervical traction by PT. X. Supine manual cervical traction by PT. Stm c-t area by PT while supine and while left side lying. X. STM c-t area by PT while supine and while left side lying. Cervical PROM by Pt while supine.    Supine cervical PROM by PT. Cervical PROM by PT while supine. Seated sciatic tensioners x 10 l/r. . X 10 reps each leg. Seated sciatic tensioners x 15 l/r. While left side lying:  R Scaption x 10. R HA x 10. Supine scalene stretching by PT. X Supine scalene stretching l/r by PT. Supine pectoralis stretching by PT. X Supine pectoralis muscle stretching l/r by PT.      HEP: B SR, LTR hook lying, B pectoralis stretch, cervical rotation, sciatic nerve glides, median nerve glides. Charges: Arturo Lema.        Total Timed Treatment: 40 min  Total Treatment Time: 40 min

## 2023-09-22 ENCOUNTER — OFFICE VISIT (OUTPATIENT)
Dept: PHYSICAL THERAPY | Facility: HOSPITAL | Age: 68
End: 2023-09-22
Attending: PHYSICIAN ASSISTANT
Payer: MEDICARE

## 2023-09-22 PROCEDURE — 97110 THERAPEUTIC EXERCISES: CPT

## 2023-09-22 PROCEDURE — 97140 MANUAL THERAPY 1/> REGIONS: CPT

## 2023-09-22 NOTE — PROGRESS NOTES
Diagnosis:   Associated DX:  Chronic right-sided low back pain without sciatica (M54.50,G89.29)  Chronic neck pain (M54.2,G89.2   Referring Provider: Nola Patterson  Date of Evaluation:    9/8/23    Precautions:  None Next MD visit:   none scheduled  Date of Surgery: n/a   Insurance Primary/Secondary: MEDICARE /      # Auth Visits: [id number]            Subjective:   Notes feeling \"pretty good\" this morning. Notes waking up with a little bit of low back pain. Notes numbness, tingling B hands and feet each night, but a little less frequently. Is using ice prn and heat prn. .. Pain:   2/10    Objective: see treatment flow sheet. Assessment: Tolerated manual therapy and exercises well. Goals:  (to be met in 10 visits)   Pt will report sleeping at least 5-6 hours undisturbed by increased pain. Pt will report improved tolerance to lifting/carrying things. Pt will report improved cervical ROM while driving. Pt will be independent with an upgraded HEP. Plan: as noted in initial evaluation. .. Date: 9/11/2023  TX#: 2/10 Date:  9/15/23               TX#: 3/10 Date:  9/20/23               TX#: 4/10 Date: 9/22/23                TX#: 5/10 Date: Tx#: 6/   NuStep (8/9) load 5 for 5 minutes. Load 5 for 5 minutes. NuStep (8/9) load 5 for 5 minutes  Load 5 for 6 minutes. DKTC with heels on SB x 20 reps. . X 20 reps. . DKTC with heels on SB x 20 reps. X 20 reps. Isabellet Reta LTR with calves on SB x 20 l/r. . X 20 l/r. Charlott Reta LTR with calves on SB x 20 l/r. . X 20 l/r. .    Bridging with calves on SB 3 second holds x 10 reps. . 2 x 8.  3 x 8.  3 x 8. Hook lying HS stretch with ankle pumps x 10 reps each let. X 15 reps each leg. X 15 reps each leg. X 15. Median nerve glides by PT 2 x 10 each arm. 3 x 10 each arm by PT. X. X 20 each leg. Supine manual cervical traction by PT. X. Supine manual cervical traction by PT. Supine manual cervical traction by PT.      Stm c-t area by PT while supine and while left side lying.  X. STM c-t area by PT while supine and while left side lying. STM c-t area by PT while supine and while left side lying. Cervical PROM by Pt while supine. Supine cervical PROM by PT. Cervical PROM by PT while supine. PROM cervical spine by PT while supine. Seated sciatic tensioners x 10 l/r. . X 10 reps each leg. Seated sciatic tensioners x 15 l/r. While left side lying:  R Scaption x 10. R HA x 10. While left side lying:  R Scaption x 10. R HA x 10. R ER x 10. Supine scalene stretching by PT. X Supine scalene stretching l/r by PT. Supine l/r scalene stretching by PT. Supine B PUP 3# 2 x 8. Seated sciatic tensioners x 10 each leg. Supine pectoralis stretching by PT. X Supine pectoralis muscle stretching l/r by PT. X.    HEP: B SR, LTR hook lying, B pectoralis stretch, cervical rotation, sciatic nerve glides, median nerve glides. Charges: Shanell Brewster.        Total Timed Treatment: 40 min  Total Treatment Time: 40 min

## 2023-09-25 ENCOUNTER — OFFICE VISIT (OUTPATIENT)
Dept: PHYSICAL THERAPY | Facility: HOSPITAL | Age: 68
End: 2023-09-25
Attending: PHYSICIAN ASSISTANT
Payer: MEDICARE

## 2023-09-25 PROCEDURE — 97110 THERAPEUTIC EXERCISES: CPT

## 2023-09-25 PROCEDURE — 97140 MANUAL THERAPY 1/> REGIONS: CPT

## 2023-09-25 NOTE — PROGRESS NOTES
Diagnosis:   Associated DX:  Chronic right-sided low back pain without sciatica (M54.50,G89.29)  Chronic neck pain (M54.2,G89.2   Referring Provider: Antonio Mejia  Date of Evaluation:    9/8/23    Precautions:  None Next MD visit:   none scheduled  Date of Surgery: n/a   Insurance Primary/Secondary: MEDICARE /      # Auth Visits: 10            Subjective:   Noted the atraumatic onset of right medial scapular area burning, tingling that is rated 4-5/10 currently. Notes soreness in her lower back that is rated 2/10 and lessens noticeably when she moves around a bit. Notes numbness, tingling B hands and feet each night, but a little less frequently. Is using ice prn and heat prn. .. Pain:   2/10    Objective: see treatment flow sheet. Assessment: Tolerated manual therapy and exercises well. Goals:  (to be met in 10 visits)   Pt will report sleeping at least 5-6 hours undisturbed by increased pain. Pt will report improved tolerance to lifting/carrying things. Pt will report improved cervical ROM while driving. Pt will be independent with an upgraded HEP. Plan: as noted in initial evaluation. .. Date: 9/11/2023  TX#: 2/10 Date:  9/15/23               TX#: 3/10 Date:  9/20/23               TX#: 4/10 Date: 9/22/23                TX#: 5/10 Date: 9/25/23  Tx#: 6/10     NuStep (8/9) load 5 for 5 minutes. Load 5 for 5 minutes. NuStep (8/9) load 5 for 5 minutes  Load 5 for 6 minutes. Load 5 for 6 minutes NuStep (8/9). DKTC with heels on SB x 20 reps. . X 20 reps. . DKTC with heels on SB x 20 reps. X 20 reps. . DKTC with heels on SB x 25 reps. Mariellen Callie LTR with calves on SB x 20 l/r. . X 20 l/r. Mariellen Callie LTR with calves on SB x 20 l/r. . X 20 l/r. Mariellen Callie LTR with calves on SB x 20 l/r. .     Bridging with calves on SB 3 second holds x 10 reps. . 2 x 8.  3 x 8.  3 x 8. Bridging with calves on SB 3 x 8. Hook lying HS stretch with ankle pumps x 10 reps each let. X 15 reps each leg. X 15 reps each leg. X 15.   X 15 reps each leg. Median nerve glides by PT 2 x 10 each arm. 3 x 10 each arm by PT. X. X 20 each leg. 3 x 10 l/r. .. Supine manual cervical traction by PT. X. Supine manual cervical traction by PT. Supine manual cervical traction by PT. X.     Stm c-t area by PT while supine and while left side lying. X. STM c-t area by PT while supine and while left side lying. STM c-t area by PT while supine and while left side lying. X.      Cervical PROM by Pt while supine. Supine cervical PROM by PT. Cervical PROM by PT while supine. PROM cervical spine by PT while supine. Cervical spine PROM by PT. Seated sciatic tensioners x 10 l/r. . X 10 reps each leg. Seated sciatic tensioners x 15 l/r. While left side lying:  R Scaption x 10. R HA x 10. While left side lying:  R Scaption x 10. R HA x 10. R ER x 10. While left side lying:    R Scaption x 10. R HA x 10. R ER x 10. Supine scalene stretching by PT. X Supine scalene stretching l/r by PT. Supine l/r scalene stretching by PT. Supine B PUP 3# 2 x 8. Seated sciatic tensioners x 10 each leg. Supine l/r scalene stretching by PT. Supine B PUP 3# 2 x 10. Seated sciatic tensoners x 10 l/r. .     Supine pectoralis stretching by PT. X Supine pectoralis muscle stretching l/r by PT. X. Supine l/r pectoralis stretching by PT.      HEP: B SR, LTR hook lying, B pectoralis stretch, cervical rotation, sciatic nerve glides, median nerve glides. Charges: [employer].        Total Timed Treatment: 42 min  Total Treatment Time: 42 min

## 2023-09-28 ENCOUNTER — APPOINTMENT (OUTPATIENT)
Dept: PHYSICAL THERAPY | Facility: HOSPITAL | Age: 68
End: 2023-09-28
Attending: PHYSICIAN ASSISTANT
Payer: MEDICARE

## 2023-10-09 ENCOUNTER — OFFICE VISIT (OUTPATIENT)
Dept: PHYSICAL THERAPY | Facility: HOSPITAL | Age: 68
End: 2023-10-09
Attending: PHYSICIAN ASSISTANT
Payer: MEDICARE

## 2023-10-09 PROCEDURE — 97110 THERAPEUTIC EXERCISES: CPT

## 2023-10-09 PROCEDURE — 97140 MANUAL THERAPY 1/> REGIONS: CPT

## 2023-10-09 NOTE — PROGRESS NOTES
Diagnosis:   Associated DX:  Chronic right-sided low back pain without sciatica (M54.50,G89.29)  Chronic neck pain (M54.2,G89.2   Referring Provider: Zen Garcia  Date of Evaluation:    9/8/23    Precautions:  None Next MD visit:   none scheduled  Date of Surgery: n/a   Insurance Primary/Secondary: MEDICARE / [email or web address]     # Auth Visits: 10            Subjective:   Noted the atraumatic onset of right medial scapular area burning, tingling that is rated 1/10 currently. Notes soreness in her lower back that is rated 0/10 this morning. Notes numbness, tingling B hands and feet each night, but a little better. Is using ice prn and heat prn. .. Pain:    0-1/10    Objective: see treatment flow sheet. Assessment: Tolerated manual therapy and exercises well. Is less symptomatic overall. Goals:  (to be met by discharge)   Pt will report sleeping at least 5-6 hours undisturbed by increased pain. Pt will report improved tolerance to lifting/carrying things. Pt will report improved cervical ROM while driving. Pt will be independent with an upgraded HEP. Plan: Will re-assess at next visit for probable discharge. Date: 9/11/2023  TX#: 2/10 Date:  9/15/23               TX#: 3/10 Date:  9/20/23               TX#: 4/10 Date: 9/22/23                TX#: 5/10 Date: 9/25/23  Tx#: 6/10 10/9/23  #7    NuStep (8/9) load 5 for 5 minutes. Load 5 for 5 minutes. NuStep (8/9) load 5 for 5 minutes  Load 5 for 6 minutes. Load 5 for 6 minutes NuStep (8/9). Load 5 for 5 minutes. DKTC with heels on SB x 20 reps. . X 20 reps. . DKTC with heels on SB x 20 reps. X 20 reps. . DKTC with heels on SB x 25 reps. . X 20 reps. LTR with calves on SB x 20 l/r. . X 20 l/r. Davina Knowles LTR with calves on SB x 20 l/r. . X 20 l/r. Davina Knowles LTR with calves on SB x 20 l/r. . X 20 l/r. .    Bridging with calves on SB 3 second holds x 10 reps. . 2 x 8.  3 x 8.  3 x 8. Bridging with calves on SB 3 x 8.  3 x 8.      Hook lying HS stretch with ankle pumps x 10 reps each let. X 15 reps each leg. X 15 reps each leg. X 15. X 15 reps each leg. X 10 l/r. .    Median nerve glides by PT 2 x 10 each arm. 3 x 10 each arm by PT. X. X 20 each leg. 3 x 10 l/r. .. X     Supine manual cervical traction by PT. X. Supine manual cervical traction by PT. Supine manual cervical traction by PT. X. X.    Stm c-t area by PT while supine and while left side lying. X. STM c-t area by PT while supine and while left side lying. STM c-t area by PT while supine and while left side lying. X.  X.    Cervical PROM by Pt while supine. Supine cervical PROM by PT. Cervical PROM by PT while supine. PROM cervical spine by PT while supine. Cervical spine PROM by PT. Supine cervical PROM by PT. Seated sciatic tensioners x 10 l/r. . X 10 reps each leg. Seated sciatic tensioners x 15 l/r. While left side lying:  R Scaption x 10. R HA x 10. While left side lying:  R Scaption x 10. R HA x 10. R ER x 10. While left side lying:    R Scaption x 10. R HA x 10. R ER x 10. While left side lying:   R ER x 10. R HA x 10. R Scaption x 10. Supine scalene stretching by PT. X Supine scalene stretching l/r by PT. Supine l/r scalene stretching by PT. Supine B PUP 3# 2 x 8. Seated sciatic tensioners x 10 each leg. Supine l/r scalene stretching by PT. Supine B PUP 3# 2 x 10. Seated sciatic tensoners x 10 l/r. . Supine l/r scalene stretching by PT. Supine B PUP 3# 3 x 8.       2 x 10 each leg. Supine pectoralis stretching by PT. X Supine pectoralis muscle stretching l/r by PT. X. Supine l/r pectoralis stretching by PT. X.     HEP: B SR, LTR hook lying, B pectoralis stretch, cervical rotation, sciatic nerve glides, median nerve glides. Charges: Massiel Fernandez.        Total Timed Treatment: 40 min  Total Treatment Time: 40 min

## 2023-10-13 ENCOUNTER — OFFICE VISIT (OUTPATIENT)
Dept: PHYSICAL THERAPY | Facility: HOSPITAL | Age: 68
End: 2023-10-13
Attending: INTERNAL MEDICINE
Payer: MEDICARE

## 2023-10-13 PROCEDURE — 97110 THERAPEUTIC EXERCISES: CPT

## 2023-10-13 PROCEDURE — 97140 MANUAL THERAPY 1/> REGIONS: CPT

## 2023-10-13 NOTE — PROGRESS NOTES
Diagnosis:   Associated DX:  Chronic right-sided low back pain without sciatica (M54.50,G89.29)  Chronic neck pain (M54.2,G89.2   Referring Provider: No ref. provider found  Date of Evaluation:    9/8/23    Precautions:  None Next MD visit:   none scheduled  Date of Surgery: n/a   Insurance Primary/Secondary: MEDICARE /      # Auth Visits: 10        Discharge Summary  Pt has attended 8 visits in Physical Therapy. Subjective:   Notes her right medial scapular area burning, tingling is rated 0-1/10 currently. Notes soreness in her lower back that is rated 0/10 this morning. Is using ice prn and heat prn. .. Notes that she sleeping fine, lifting things is ok, walking, standing and stair negotiation is fine as well as driving. Objective:  Cervical and TL AROM is wfls throughout. B shoulder AROM is wfls. Arzella Oats UE/LE strength is 5/5. Gait is a at normal pace and pain-free. Assessment/Plan:  Is much less symptomatic overall. Recommend discharge from PT with patient continuing to do her HEP on a regular basis. Goals:  (to be met by discharge)   Pt will report sleeping at least 5-6 hours undisturbed by increased pain. Met. Pt will report improved tolerance to lifting/carrying things. Met. Pt will report improved cervical ROM while driving. Met. Pt will be independent with an upgraded HEP. Met. Date: 9/11/2023  TX#: 2/10 Date:  9/15/23               TX#: 3/10 Date:  9/20/23               TX#: 4/10 Date: 9/22/23                TX#: 5/10 Date: 9/25/23  Tx#: 6/10 10/9/23  #7 10/13/23  #8   NuStep (8/9) load 5 for 5 minutes. Load 5 for 5 minutes. NuStep (8/9) load 5 for 5 minutes  Load 5 for 6 minutes. Load 5 for 6 minutes NuStep (8/9). Load 5 for 5 minutes. For 5 minutes. DKTC with heels on SB x 20 reps. . X 20 reps. . DKTC with heels on SB x 20 reps. X 20 reps. . DKTC with heels on SB x 25 reps. . X 20 reps. X 20 reps. Tushar Oats LTR with calves on SB x 20 l/r. . X 20 l/r. . LTR with calves on SB x 20 l/r. . X 20 l/r. Raegan Shell LTR with calves on SB x 20 l/r. . X 20 l/r. . X 20 l/r. .   Bridging with calves on SB 3 second holds x 10 reps. . 2 x 8.  3 x 8.  3 x 8. Bridging with calves on SB 3 x 8.  3 x 8.  3 x 8. Hook lying HS stretch with ankle pumps x 10 reps each let. X 15 reps each leg. X 15 reps each leg. X 15. X 15 reps each leg. X 10 l/r. Raegan Shell X 15 each leg. Median nerve glides by PT 2 x 10 each arm. 3 x 10 each arm by PT. X. X 20 each leg. 3 x 10 l/r. .. X  X 20 each arm. Supine manual cervical traction by PT. X. Supine manual cervical traction by PT. Supine manual cervical traction by PT. X. X. X.   Stm c-t area by PT while supine and while left side lying. X. STM c-t area by PT while supine and while left side lying. STM c-t area by PT while supine and while left side lying. X.  X. X.   Cervical PROM by Pt while supine. Supine cervical PROM by PT. Cervical PROM by PT while supine. PROM cervical spine by PT while supine. Cervical spine PROM by PT. Supine cervical PROM by PT. Supine cervical PROM by PT. Seated sciatic tensioners x 10 l/r. . X 10 reps each leg. Seated sciatic tensioners x 15 l/r. While left side lying:  R Scaption x 10. R HA x 10. While left side lying:  R Scaption x 10. R HA x 10. R ER x 10. While left side lying:    R Scaption x 10. R HA x 10. R ER x 10. While left side lying:   R ER x 10. R HA x 10. R Scaption x 10. While left side lying:    R ER x 10. R Scaption x 10. R HA x 10. Supine scalene stretching by PT. X Supine scalene stretching l/r by PT. Supine l/r scalene stretching by PT. Supine B PUP 3# 2 x 8. Seated sciatic tensioners x 10 each leg. Supine l/r scalene stretching by PT. Supine B PUP 3# 2 x 10. Seated sciatic tensoners x 10 l/r. . Supine l/r scalene stretching by PT. Supine B PUP 3# 3 x 8.       2 x 10 each leg. X.        3# 2 x 10. X 10 reps each leg.     Supine pectoralis stretching by PT. X Supine pectoralis muscle stretching l/r by PT. X. Supine l/r pectoralis stretching by PT. X.  -   HEP: B SR, LTR hook lying, B pectoralis stretch, cervical rotation, sciatic nerve glides, median nerve glides. Charges: Raimundo Palumboon.        Total Timed Treatment: 40 min  Total Treatment Time: 40 min

## 2024-03-25 RX ORDER — LISINOPRIL 10 MG/1
TABLET ORAL
Qty: 90 TABLET | Refills: 3 | Status: SHIPPED | OUTPATIENT
Start: 2024-03-25

## 2024-03-25 NOTE — TELEPHONE ENCOUNTER
Last VISIT:07/19/2023 DEJAH READ    Last CPE:07/19/2023    Last REFILL:03/27/2023   Medication Quantity Refills Start End   LISINOPRIL 10 MG Oral Tab 90 tablet 3         Last LABS:07/17/2023    Future Appointments   Date Time Provider Department Center   7/25/2024 10:00 AM Manuela Joya PA-C [facility]         Per PROTOCOL? Passed Protocol

## 2024-06-06 ENCOUNTER — TELEPHONE (OUTPATIENT)
Dept: INTERNAL MEDICINE CLINIC | Facility: CLINIC | Age: 69
End: 2024-06-06

## 2024-06-06 DIAGNOSIS — Z12.31 ENCOUNTER FOR SCREENING MAMMOGRAM FOR MALIGNANT NEOPLASM OF BREAST: Primary | ICD-10-CM

## 2024-06-06 NOTE — TELEPHONE ENCOUNTER
Last mammo 7/7/23:    Impression   CONCLUSION:       BI-RADS CATEGORY:    DIAGNOSTIC CATEGORY 2--BENIGN FINDING:       RECOMMENDATIONS:    ROUTINE MAMMOGRAM AND CLINICAL EVALUATION IN 12 MONTHS.       Because of breast density, this patient may benefit from supplemental screening with breast MRI, Molecular Breast Imaging (MBI) or bilateral whole breast ultrasound for increased sensitivity for detection of cancer which can be obscured mammographically.    Please contact your ordering provider to discuss supplemental screening options.       Order pended.

## 2024-06-06 NOTE — TELEPHONE ENCOUNTER
Patient scheduled on below for physical.  Patient looking for Mammogram order  Future Appointments   Date Time Provider Department Center   7/25/2024 10:00 AM Manuela Joya PA-C [facility]

## 2024-06-06 NOTE — TELEPHONE ENCOUNTER
I called patient in response to a [employer]riv[employer] message received--->LMTCB to discuss symptoms further and possibly offer virtual visit. [employer] message was also sent requesting call back, see message      From: Frandy Maciel  To:  Hernando Villa MD  Sent: 2 followed protocols

## 2024-07-13 ENCOUNTER — HOSPITAL ENCOUNTER (OUTPATIENT)
Dept: MAMMOGRAPHY | Age: 69
Discharge: HOME | End: 2024-07-13
Attending: NURSE PRACTITIONER
Payer: MEDICARE

## 2024-07-13 DIAGNOSIS — Z12.31 ENCOUNTER FOR SCREENING MAMMOGRAM FOR MALIGNANT NEOPLASM OF BREAST: ICD-10-CM

## 2024-07-13 PROCEDURE — 77063 BREAST TOMOSYNTHESIS BI: CPT | Performed by: NURSE PRACTITIONER

## 2024-07-13 PROCEDURE — 77067 SCR MAMMO BI INCL CAD: CPT | Performed by: NURSE PRACTITIONER

## 2024-07-15 ENCOUNTER — TELEPHONE (OUTPATIENT)
Dept: INTERNAL MEDICINE CLINIC | Facility: CLINIC | Age: 69
End: 2024-07-15

## 2024-07-15 DIAGNOSIS — Z00.00 ROUTINE GENERAL MEDICAL EXAMINATION AT A HEALTH CARE FACILITY: ICD-10-CM

## 2024-07-15 DIAGNOSIS — I10 PRIMARY HYPERTENSION: Primary | ICD-10-CM

## 2024-07-15 NOTE — TELEPHONE ENCOUNTER
Orders to     Edward          Pt aware to get labs done no sooner than 2 weeks prior to the appt.  Pt aware to fast.  No call back required.  Patient stated she'll go Wednesday, July 17    Future Appointments   Date Time Provider Department Center   7/25/2024 10:00 AM Manuela Joya PA-C [facility]

## 2024-08-12 DIAGNOSIS — E78.00 HIGH CHOLESTEROL: ICD-10-CM

## 2024-08-15 ENCOUNTER — LAB ENCOUNTER (OUTPATIENT)
Dept: LAB | Age: 69
End: 2024-08-15
Attending: PHYSICIAN ASSISTANT
Payer: MEDICARE

## 2024-08-15 DIAGNOSIS — Z00.00 ROUTINE GENERAL MEDICAL EXAMINATION AT A HEALTH CARE FACILITY: ICD-10-CM

## 2024-08-15 DIAGNOSIS — I10 PRIMARY HYPERTENSION: ICD-10-CM

## 2024-08-15 LAB
ALBUMIN SERPL-MCNC: 4.6 G/DL (ref 3.2–4.8)
ALBUMIN/GLOB SERPL: 1.9 {RATIO} (ref 1–2)
ALP LIVER SERPL-CCNC: 99 U/L
ALT SERPL-CCNC: 28 U/L
ANION GAP SERPL CALC-SCNC: 7 MMOL/L (ref 0–18)
AST SERPL-CCNC: 29 U/L (ref ?–34)
BASOPHILS # BLD AUTO: 0.04 X10(3) UL (ref 0–0.2)
BASOPHILS NFR BLD AUTO: 0.6 %
BILIRUB SERPL-MCNC: 1.7 MG/DL (ref 0.2–1.1)
BUN BLD-MCNC: 10 MG/DL (ref 9–23)
CALCIUM BLD-MCNC: 9.9 MG/DL (ref 8.7–10.4)
CHLORIDE SERPL-SCNC: 105 MMOL/L (ref 98–112)
CHOLEST SERPL-MCNC: 181 MG/DL (ref ?–200)
CO2 SERPL-SCNC: 27 MMOL/L (ref 21–32)
CREAT BLD-MCNC: 0.79 MG/DL
EGFRCR SERPLBLD CKD-EPI 2021: 82 ML/MIN/1.73M2 (ref 60–?)
EOSINOPHIL # BLD AUTO: 0.13 X10(3) UL (ref 0–0.7)
EOSINOPHIL NFR BLD AUTO: 2 %
ERYTHROCYTE [DISTWIDTH] IN BLOOD BY AUTOMATED COUNT: 12.2 %
FASTING PATIENT LIPID ANSWER: YES
FASTING STATUS PATIENT QL REPORTED: YES
GLOBULIN PLAS-MCNC: 2.4 G/DL (ref 2–3.5)
GLUCOSE BLD-MCNC: 89 MG/DL (ref 70–99)
HCT VFR BLD AUTO: 43.6 %
HDLC SERPL-MCNC: 58 MG/DL (ref 40–59)
HGB BLD-MCNC: 14.8 G/DL
IMM GRANULOCYTES # BLD AUTO: 0.01 X10(3) UL (ref 0–1)
IMM GRANULOCYTES NFR BLD: 0.2 %
LDLC SERPL CALC-MCNC: 107 MG/DL (ref ?–100)
LYMPHOCYTES # BLD AUTO: 1.89 X10(3) UL (ref 1–4)
LYMPHOCYTES NFR BLD AUTO: 29.1 %
MCH RBC QN AUTO: 31.5 PG (ref 26–34)
MCHC RBC AUTO-ENTMCNC: 33.9 G/DL (ref 31–37)
MCV RBC AUTO: 92.8 FL
MONOCYTES # BLD AUTO: 0.5 X10(3) UL (ref 0.1–1)
MONOCYTES NFR BLD AUTO: 7.7 %
NEUTROPHILS # BLD AUTO: 3.92 X10 (3) UL (ref 1.5–7.7)
NEUTROPHILS # BLD AUTO: 3.92 X10(3) UL (ref 1.5–7.7)
NEUTROPHILS NFR BLD AUTO: 60.4 %
NONHDLC SERPL-MCNC: 123 MG/DL (ref ?–130)
OSMOLALITY SERPL CALC.SUM OF ELEC: 287 MOSM/KG (ref 275–295)
PLATELET # BLD AUTO: 328 10(3)UL (ref 150–450)
POTASSIUM SERPL-SCNC: 4.2 MMOL/L (ref 3.5–5.1)
PROT SERPL-MCNC: 7 G/DL (ref 5.7–8.2)
RBC # BLD AUTO: 4.7 X10(6)UL
SODIUM SERPL-SCNC: 139 MMOL/L (ref 136–145)
T3FREE SERPL-MCNC: 3.35 PG/ML (ref 2.4–4.2)
T4 FREE SERPL-MCNC: 1.3 NG/DL (ref 0.8–1.7)
TRIGL SERPL-MCNC: 87 MG/DL (ref 30–149)
TSI SER-ACNC: 0.49 MIU/ML (ref 0.55–4.78)
VLDLC SERPL CALC-MCNC: 15 MG/DL (ref 0–30)
WBC # BLD AUTO: 6.5 X10(3) UL (ref 4–11)

## 2024-08-15 PROCEDURE — 36415 COLL VENOUS BLD VENIPUNCTURE: CPT

## 2024-08-15 PROCEDURE — 84443 ASSAY THYROID STIM HORMONE: CPT

## 2024-08-15 PROCEDURE — 80053 COMPREHEN METABOLIC PANEL: CPT

## 2024-08-15 PROCEDURE — 85025 COMPLETE CBC W/AUTO DIFF WBC: CPT

## 2024-08-15 PROCEDURE — 80061 LIPID PANEL: CPT

## 2024-08-15 PROCEDURE — 84481 FREE ASSAY (FT-3): CPT

## 2024-08-15 PROCEDURE — 84439 ASSAY OF FREE THYROXINE: CPT

## 2024-08-15 RX ORDER — ATORVASTATIN CALCIUM 10 MG/1
10 TABLET, FILM COATED ORAL DAILY
Qty: 90 TABLET | Refills: 0 | Status: SHIPPED | OUTPATIENT
Start: 2024-08-15

## 2024-08-15 NOTE — TELEPHONE ENCOUNTER
Please review. Protocol Failed; No Protocol    Requested Prescriptions   Pending Prescriptions Disp Refills    ATORVASTATIN 10 MG Oral Tab [Pharmacy Med Name: ATORVASTATIN TABS 10MG] 90 tablet 3     Sig: TAKE 1 TABLET DAILY       Cholesterol Medication Protocol Failed - 8/12/2024  2:07 AM        Failed - ALT < 80     Lab Results   Component Value Date    ALT 29 07/17/2023             Failed - ALT resulted within past year        Failed - Lipid panel within past 12 months     Lab Results   Component Value Date    CHOLEST 173 07/17/2023    TRIG 67 07/17/2023    HDL 72 (H) 07/17/2023    LDL 88 07/17/2023    VLDL 11 07/17/2023    TCHDLRATIO 3.22 12/18/2017    NONHDLC 101 07/17/2023             Passed - In person appointment or virtual visit in the past 12 mos or appointment in next 3 mos     Recent Outpatient Visits              3 days ago ZAKIA Piper Scott, MD    Office Visit    10 months ago     [facility] Rick Fountain, PT    Office Visit    10 months ago     [facility] Rick Fountain, PT    Office Visit    10 months ago     [facility] Rick Fountain, PT    Office Visit    10 months ago     [facility] Rick Fountain, PT    Office Visit          Future Appointments         Provider Department Appt Notes    In 1 week Manuela Joya PA-C [place] [id number]-last 7/23                           Future Appointments         Provider Department Appt Notes    In 1 week Manuela Joya PA-C [facility], [address] [place] [address]-last 7/23          Recent Outpatient Visits              3 days ago ZAKIA Piper Scott, MD    Office Visit    10 months ago     [facility] Rick Fountain, PT    Office Visit    10 months ago     [facility] Rick Fountani, PT    Office Visit     10 months ago     [facility] Rick Fountain, PT    Office Visit    10 months ago     [facility] Rick Fountain, PT    Office Visit

## 2024-08-20 NOTE — PROGRESS NOTES
Subjective:   Annie Gutierrez is a 68 year old female who presents for a Subsequent Annual Wellness visit (Pt already had Initial Annual Wellness) and scheduled follow up of multiple significant but stable problems.     Lots of stress related to family and is losing sleep and having increased anxiety.  Has been on Lexapro before when having increased anxiety during \"change of life.\"  She did well on it and tolerated without SEs.  Would like to consider restarting.    History/Other:   Fall Risk Assessment:   She has been screened for Falls and is low risk.      Cognitive Assessment:   She had a completely normal cognitive assessment - see flowsheet entries       Functional Ability/Status:   Annie Gutierrez has a completely normal functional assessment. See flowsheet for details.      Depression Screening (PHQ):  PHQ-2 SCORE: 0  , done 8/15/2024             Advanced Directives:   She does have a Living Will but we do NOT have it on file in Epic.    She does have a POA but we do NOT have it on file in [employer].    Patient has Advance Care Planning documents but we do not have a copy in EMR. Discussed Advanced Care Planning with patient and instructed patient to get our office a copy to be scanned into EMR.      Patient Active Problem List   Diagnosis    High cholesterol - compliant with statin, controlled on recent labs.    Vitamin D deficiency - Normal on last check.    Gallstones - stable with no new sxs    Vaginal atrophy - stable with no new complaints    Calculus of both kidneys - stable with no new issues    Lumbar radiculopathy - chronic waxing and waning low back pain.  Stable currently    HNP (herniated nucleus pulposus), lumbar- chronic waxing and waning low back pain.  Has been seeing chiro and it is helpful    Pre-diabetes - normal glucose on recent     Vitamin B12 deficiency - on supplement    Class 2 obesity due to excess calories without serious comorbidity with body mass index (BMI) of 35.0 to 35.9 in  adult - has been losing weight, working on diet and exercise especially over the summer    Family history of colon cancer in father - colonoscopy UTD    Carpal tunnel syndrome - had order for EMG.      History of adenomatous polyp of colon - colonoscopy UTD.    Diverticulosis - stable with no new issues    Internal hemorrhoids - stable with no new issues.    Primary hypertension - compliant with meds,     Disturbance of skin sensation - has order for EMG.     Allergies:  She is allergic to ceclor.    Current Medications:  Outpatient Medications Marked as Taking for the 8/22/24 encounter (Office Visit) with Manuela Joya PA-C   Medication Sig    clotrimazole 1 % External Cream Apply 1 Application topically 2 (two) times daily.    escitalopram (LEXAPRO) 5 MG Oral Tab Take 1 tablet (5 mg total) by mouth daily.    atorvastatin 10 MG Oral Tab Take 1 tablet (10 mg total) by mouth daily.    desonide 0.05 % External Cream Apply 1 Tube topically 2 (two) times daily. Apply sparingly and rub gently into the affected area(s).    LISINOPRIL 10 MG Oral Tab TAKE 1 TABLET DAILY    Probiotic Product (PROBIOTIC-10 OR) Take by mouth.    Magnesium 300 MG Oral Cap Take by mouth.    Pyridoxine HCl (VITAMIN B-6 OR) Take by mouth.    Menaquinone-7 (VITAMIN K2 OR) Take by mouth.    Aspirin 81 MG Oral Tab Take 1 tablet (81 mg total) by mouth daily.    Cholecalciferol (VITAMIN D) 2000 UNITS Oral Tab Take 1 tablet by mouth daily.       Medical History:  She  has a past medical history of Acute upper respiratory infections of unspecified site, Anxiety state, unspecified, Arthritis (2020), Calculus of kidney, Closed fracture of unspecified part of femur, Fatty liver (12/19/2012), Frequent UTI (2014), High cholesterol, Morbid obesity (HCC) (05/06/2019), Night sweats (2003), Osteoarthritis, Visual impairment, and Wears glasses (1970).  Surgical History:  She  has a past surgical history that includes femur/knee surg unlisted; foot/toes surgery  proc unlisted; ; colonoscopy (2010); correct bunion,othr methods (2012); haja biopsy stereo nodule 2 site bilat (cpt=19081/28036); tonsillectomy; removal gallbladder; other surgical history; other surgical history (2015); other surgical history (2018); cholecystectomy; haja localization wire 1 site left (cpt=19281) (Left, ); and colonoscopy.   Family History:  Her family history includes Cancer in her father; Colon Cancer in her father; Dementia in her mother; Heart Disease in her father; Heart Disorder in her father; Other in her mother; bunions in her father.  Social History:  She  reports that she quit smoking about 50 years ago. Her smoking use included cigarettes. She has never used smokeless tobacco. She reports current alcohol use of about 2.0 standard drinks of alcohol per week. She reports that she does not use drugs.    Tobacco:  She smoked tobacco in the past but quit greater than 12 months ago.  Social History     Tobacco Use   Smoking Status Former    Current packs/day: 0.00    Types: Cigarettes    Quit date: 1974    Years since quittin.3   Smokeless Tobacco Never   Tobacco Comments    Occasionally ( very light )        CAGE Alcohol Screen:   CAGE screening score of 0 on 8/15/2024, showing low risk of alcohol abuse.      Patient Care Team:  Schriedel, Adam, MD as PCP - General  Garrison Cat RD (Dietitian)  Swati Johnson APRN (Nurse Practitioner)  Manuela Joya PA-C (Physician Assistant)  Jeannette Crowe PA-C (Physician Assistant Medical)  Rudolph Morrison DO as Consulting Physician (NEUROLOGY)  Rick Fountain PT as Physical Therapist    Review of Systems  GENERAL: feels well otherwise  SKIN: denies any unusual skin lesions, saw Derm recently for skin check  EYES: denies blurred vision or double vision  HEENT: denies nasal congestion, sinus pain or ST  LUNGS: denies shortness of breath with exertion  CARDIOVASCULAR: denies chest pain on  exertion  GI: denies abdominal pain, denies heartburn  : denies dysuria, vaginal discharge or itching, no complaint of urinary incontinence   MUSCULOSKELETAL: denies back pain  NEURO: denies headaches  PSYCHE: no depression, see also above  HEMATOLOGIC: denies hx of anemia  ENDOCRINE: see above  ALL/ASTHMA: denies hx of allergy or asthma    Objective:   Physical Exam  Objective:   Physical Exam  General Appearance:  Alert, cooperative, no distress, appears stated age   Head:  Normocephalic, without obvious abnormality, atraumatic   Eyes:  PERRL, conjunctiva/corneas clear, EOM's intact both eyes   Ears:  Normal TM's and external ear canals, both ears   Nose: Nares normal, septum midline   Throat: Lips, mucosa, and tongue normal   Neck: Supple, symmetrical, trachea midline, no adenopathy;  no carotid bruit or JVD   Back:   Symmetric, no curvature   Lungs:   Clear to auscultation bilaterally, respirations unlabored   Heart:  Regular rate and rhythm, S1 and S2 normal, no murmur, rub, or gallop   Abdomen:   Soft, non-tender, bowel sounds active all four quadrants,  no masses, no organomegaly   Pelvic: Deferred   Extremities: Extremities normal, atraumatic, no cyanosis or edema   Pulses: 2+ and symmetric   Skin: Skin color, texture, turgor normal, no rashes or lesions   Lymph nodes: Cervical, supraclavicular nodes normal   Neurologic: Normal   Breast:  Bilateral without masses, axillary nodes, nipple discharge and skin changes    A1c 5.6      /64   Pulse 97   Temp 97.1 °F (36.2 °C)   Resp 16   Ht 5' 3\" (1.6 m)   Wt 196 lb (88.9 kg)   LMP  (LMP Unknown)   SpO2 97%   BMI 34.72 kg/m²  Estimated body mass index is 34.72 kg/m² as calculated from the following:    Height as of this encounter: 5' 3\" (1.6 m).    Weight as of this encounter: 196 lb (88.9 kg).    Medicare Hearing Assessment:   Hearing Screening    Time taken: 8/22/2024 10:07 AM  Screening Method: Finger Rub  Finger Rub Result: Pass         Visual  Acuity:   Right Eye Visual Acuity: Corrected Right Eye Chart Acuity: 20/20   Left Eye Visual Acuity: Corrected Left Eye Chart Acuity: 20/20   Both Eyes Visual Acuity: Corrected Both Eyes Chart Acuity: 20/20   Able To Tolerate Visual Acuity: Yes        Assessment & Plan:   Annie Gutierrez is a 68 year old female who presents for a Medicare Assessment.     1. Encounter for annual health examination (Primary) - Reviewed CDC guidelines for COVID booster and flu shot in the fall.  Mammo and colonoscopy UTD.  Recommend regular wt bearing exercise, adequate Calcium and Vit D (6757-9035 mg daily and 2000 int units daily, respectively).    2. Primary hypertension - controlled on current meds, continue.  -     Expanded, Low Complexity (93586)  3. High cholesterol - controlled on Atorvastatin.  Continue, and continue to work on diet, exercise and wt loss.  -     Expanded, Low Complexity (50878)  4. Pre-diabetes - A1c trending down, pt has lost some weight, encouraged continued efforts.  -     POC Hgb A1C  -     Expanded, Low Complexity (63844)  5. Vitamin B12 deficiency - stable.  -     Expanded, Low Complexity (72869)  6. Vitamin D deficiency - stable on supplement.  -     Expanded, Low Complexity (59763)  7. Lumbar radiculopathy - stable with no acute issues.  -     Expanded, Low Complexity (39904)  8. HNP (herniated nucleus pulposus), lumbar - stable with no acute issues  -     Expanded, Low Complexity (90592)  9. Bilateral carpal tunnel syndrome - stable.  -     Expanded, Low Complexity (60612)  10. Family history of colon cancer in father - colonoscopy UTD.  -     Expanded, Low Complexity (24874)  11. History of adenomatous polyp of colon - colonoscopy UTD.  -     Expanded, Low Complexity (73317)  12. Gallstones - stable, no new issues, will follow.  -     Expanded, Low Complexity (29362)  13. Diverticulosis - stable, will follow.  -     Expanded, Low Complexity (66428)  14. Internal hemorrhoids - stable, no new  complaints.  -     Expanded, Low Complexity (02743)  15. Vaginal atrophy - stable with no new complaints.  -     Expanded, Low Complexity (01203)  16. Disturbance of skin sensation - Improved with PT, will follow.  -     Expanded, Low Complexity (34461)  17. Elevated bilirubin - new but pt reports FH of Laura in sister.  Will repeat labs in Sept.  Pt to notify me if any new sxs.  -     Hepatic Function Panel (7); Future; Expected date: 08/22/2024  -     Expanded, Low Complexity (78226)  18. Tinea corporis - Clotrimazole cream.  Keep area as dry as possible.  -     Expanded, Low Complexity (06383)  19. Abnormal TSH - repeat TSH in Sept.  Normal FT3 and FT4.    -     TSH W Reflex To Free T4; Future; Expected date: 08/22/2024  -     Expanded, Low Complexity (44974)  20.  Anxiety - Increased for several months.  Restart Lexapro at 5 mg daily, follow up in 2-3 weeks.    Other orders  -     Clotrimazole; Apply 1 Application topically 2 (two) times daily.  Dispense: 85 g; Refill: 1  -     Escitalopram Oxalate; Take 1 tablet (5 mg total) by mouth daily.  Dispense: 30 tablet; Refill: 0    The patient indicates understanding of these issues and agrees to the plan.  Reinforced healthy diet, lifestyle, and exercise.      Return in about 2 weeks (around 9/5/2024) for Anxiety.     Manuela Joya PA-C, 8/20/2024     Supplementary Documentation:   General Health:  In the past six months, have you lost more than 10 pounds without trying?: 2 - No  Has your appetite been poor?: No  Type of Diet: Balanced  How does the patient maintain a good energy level?: Daily Walks  How would you describe your daily physical activity?: Moderate  How would you describe your current health state?: Good  How do you maintain positive mental well-being?: Social Interaction;Visiting Friends;Visiting Family  On a scale of 0 to 10, with 0 being no pain and 10 being severe pain, what is your pain level?: 3 - (Mild)  In the past six months, have you  experienced urine leakage?: 0-No  At any time do you feel concerned for the safety/well-being of yourself and/or your children, in your home or elsewhere?: No  Have you had any immunizations at another office such as Influenza, Hepatitis B, Tetanus, or Pneumococcal?: No    Health Maintenance   Topic Date Due    COVID-19 Vaccine (4 - 2023-24 season) 09/01/2023    Annual Depression Screening  01/01/2024    Annual Physical  07/19/2024    Pneumococcal Vaccine: 65+ Years (3 of 3 - PPSV23 or PCV20) 08/24/2024    Influenza Vaccine (1) 10/01/2024    Mammogram  07/13/2025    Colorectal Cancer Screening  10/27/2025    DEXA Scan  Completed    Fall Risk Screening (Annual)  Completed    Zoster Vaccines  Completed

## 2024-08-22 ENCOUNTER — OFFICE VISIT (OUTPATIENT)
Dept: INTERNAL MEDICINE CLINIC | Facility: CLINIC | Age: 69
End: 2024-08-22
Payer: MEDICARE

## 2024-08-22 VITALS
WEIGHT: 196 LBS | BODY MASS INDEX: 34.73 KG/M2 | DIASTOLIC BLOOD PRESSURE: 64 MMHG | OXYGEN SATURATION: 97 % | HEART RATE: 97 BPM | SYSTOLIC BLOOD PRESSURE: 130 MMHG | RESPIRATION RATE: 16 BRPM | TEMPERATURE: 97 F | HEIGHT: 63 IN

## 2024-08-22 DIAGNOSIS — Z86.010 HISTORY OF ADENOMATOUS POLYP OF COLON: ICD-10-CM

## 2024-08-22 DIAGNOSIS — K80.20 GALLSTONES: ICD-10-CM

## 2024-08-22 DIAGNOSIS — E53.8 VITAMIN B12 DEFICIENCY: ICD-10-CM

## 2024-08-22 DIAGNOSIS — B35.4 TINEA CORPORIS: ICD-10-CM

## 2024-08-22 DIAGNOSIS — E55.9 VITAMIN D DEFICIENCY: ICD-10-CM

## 2024-08-22 DIAGNOSIS — N95.2 VAGINAL ATROPHY: ICD-10-CM

## 2024-08-22 DIAGNOSIS — R20.9 DISTURBANCE OF SKIN SENSATION: ICD-10-CM

## 2024-08-22 DIAGNOSIS — M54.16 LUMBAR RADICULOPATHY: ICD-10-CM

## 2024-08-22 DIAGNOSIS — R17 ELEVATED BILIRUBIN: ICD-10-CM

## 2024-08-22 DIAGNOSIS — K64.8 INTERNAL HEMORRHOIDS: ICD-10-CM

## 2024-08-22 DIAGNOSIS — G56.03 BILATERAL CARPAL TUNNEL SYNDROME: ICD-10-CM

## 2024-08-22 DIAGNOSIS — I10 PRIMARY HYPERTENSION: ICD-10-CM

## 2024-08-22 DIAGNOSIS — M51.26 HNP (HERNIATED NUCLEUS PULPOSUS), LUMBAR: ICD-10-CM

## 2024-08-22 DIAGNOSIS — R79.89 ABNORMAL TSH: ICD-10-CM

## 2024-08-22 DIAGNOSIS — K57.30 DIVERTICULOSIS OF COLON: ICD-10-CM

## 2024-08-22 DIAGNOSIS — R73.03 PRE-DIABETES: ICD-10-CM

## 2024-08-22 DIAGNOSIS — F41.9 ANXIETY: ICD-10-CM

## 2024-08-22 DIAGNOSIS — Z80.0 FAMILY HISTORY OF COLON CANCER IN FATHER: ICD-10-CM

## 2024-08-22 DIAGNOSIS — E78.00 HIGH CHOLESTEROL: ICD-10-CM

## 2024-08-22 DIAGNOSIS — Z00.00 ENCOUNTER FOR ANNUAL HEALTH EXAMINATION: Primary | ICD-10-CM

## 2024-08-22 LAB — HEMOGLOBIN A1C: 5.6 % (ref 4.3–5.6)

## 2024-08-22 PROCEDURE — G0439 PPPS, SUBSEQ VISIT: HCPCS | Performed by: PHYSICIAN ASSISTANT

## 2024-08-22 PROCEDURE — 83036 HEMOGLOBIN GLYCOSYLATED A1C: CPT | Performed by: PHYSICIAN ASSISTANT

## 2024-08-22 PROCEDURE — 99213 OFFICE O/P EST LOW 20 MIN: CPT | Performed by: PHYSICIAN ASSISTANT

## 2024-08-22 RX ORDER — CLOTRIMAZOLE 1 %
1 CREAM (GRAM) TOPICAL 2 TIMES DAILY
Qty: 85 G | Refills: 1 | Status: SHIPPED | OUTPATIENT
Start: 2024-08-22

## 2024-08-22 RX ORDER — ESCITALOPRAM OXALATE 5 MG/1
5 TABLET ORAL DAILY
Qty: 30 TABLET | Refills: 0 | Status: SHIPPED | OUTPATIENT
Start: 2024-08-22

## 2024-08-23 ENCOUNTER — TELEPHONE (OUTPATIENT)
Dept: INTERNAL MEDICINE CLINIC | Facility: CLINIC | Age: 69
End: 2024-08-23

## 2024-08-23 NOTE — TELEPHONE ENCOUNTER
LMTCB for more information.    LOV 8/22/24: 20.  Anxiety - Increased for several months.  Restart Lexapro at 5 mg daily, follow up in 2-3 weeks.    Other orders  -     Clotrimazole; Apply 1 Application topically 2 (two) times daily.  Dispense: 85 g; Refill: 1  -     Escitalopram Oxalate; Take 1 tablet (5 mg total) by mouth daily.  Dispense: 30 tablet; Refill: 0

## 2024-08-23 NOTE — TELEPHONE ENCOUNTER
Patient called asking to leave a message for Manuela Joya PA-C to call back next week-asked what it was about and she said it was something they discussed at her visit this week.

## 2024-08-26 NOTE — TELEPHONE ENCOUNTER
Called patient to get more information. She states it is a personal call, nothing to do with medicine.   Routing to CB to call back.

## 2024-09-12 NOTE — TELEPHONE ENCOUNTER
Annie Gutierrez requesting Medication Refill for:    Medication name and dose (copy and paste from medication list):   Medication Quantity Refills Start End   escitalopram (LEXAPRO) 5 MG Oral Tab 30 tablet 0 8/22/2024 --   Sig:   Take 1 tablet (5 mg total) by mouth daily.     Route:   Oral     Order #:   [id number]         If medication is not on medication list - transfer patient to RN queue for triage    Preferred Pharmacy:   [facility] - [place] [phone], [phone]   [address] [place] [id number]   Phone: [phone] Fax: [phone]   Hours: Not open 24 hours       LOV: 8/22/2024   Last Refill date: 8/22/24  Next Scheduled appointment: No future appointments.

## 2024-09-17 RX ORDER — ESCITALOPRAM OXALATE 5 MG/1
5 TABLET ORAL DAILY
Qty: 90 TABLET | Refills: 0 | Status: SHIPPED | OUTPATIENT
Start: 2024-09-17

## 2024-09-17 NOTE — TELEPHONE ENCOUNTER
Refill passed per [facility] protocol.-Please review patient due for follow up appointment around 09/05/2024    Sent [employer] message to patient to schedule an appointment    Only 30 day supply given on 08/22/2024    Requested Prescriptions   Pending Prescriptions Disp Refills    escitalopram (LEXAPRO) 5 MG Oral Tab 30 tablet 0     Sig: Take 1 tablet (5 mg total) by mouth daily.       Psychiatric Non-Scheduled (Anti-Anxiety) Passed - 9/12/2024  3:15 PM        Passed - In person appointment or virtual visit in the past 6 mos or appointment in next 3 mos     Recent Outpatient Visits              3 weeks ago Encounter for annual health examination    [facility], [address] [place] [address] Manuela Joya PA-C    Office Visit    1 month ago ZAKIA Piper Scott, MD    Office Visit    11 months ago     [facility] Rick Fountain, PT    Office Visit    11 months ago     [facility] Rick Fountain, PT    Office Visit    11 months ago     [facility] Rick Fountain, PT    Office Visit                      Passed - Depression Screening completed within the past 12 months             Recent Outpatient Visits              3 weeks ago Encounter for annual health examination    [facility], [address] [place] [address], Manuela Mccallum PA-C    Office Visit    1 month ago ZAKIA Piper Scott, MD    Office Visit    11 months ago     [facility] Rick Fountain, PT    Office Visit    11 months ago     [facility] Rick Fountain, PT    Office Visit    11 months ago     [facility] Rick Fountain, PT    Office Visit

## 2024-10-28 ENCOUNTER — OFFICE VISIT (OUTPATIENT)
Dept: INTERNAL MEDICINE CLINIC | Facility: CLINIC | Age: 69
End: 2024-10-28
Payer: MEDICARE

## 2024-10-28 VITALS
HEART RATE: 73 BPM | BODY MASS INDEX: 33.66 KG/M2 | SYSTOLIC BLOOD PRESSURE: 128 MMHG | OXYGEN SATURATION: 95 % | WEIGHT: 190 LBS | RESPIRATION RATE: 14 BRPM | HEIGHT: 63 IN | DIASTOLIC BLOOD PRESSURE: 74 MMHG | TEMPERATURE: 98 F

## 2024-10-28 DIAGNOSIS — J18.9 PNEUMONIA OF LEFT LUNG DUE TO INFECTIOUS ORGANISM, UNSPECIFIED PART OF LUNG: Primary | ICD-10-CM

## 2024-10-28 PROCEDURE — 99214 OFFICE O/P EST MOD 30 MIN: CPT

## 2024-10-28 RX ORDER — ALBUTEROL SULFATE 90 UG/1
1 INHALANT RESPIRATORY (INHALATION) EVERY 6 HOURS PRN
COMMUNITY
Start: 2024-10-15 | End: 2024-11-14

## 2024-10-28 RX ORDER — BENZONATATE 100 MG/1
100 CAPSULE ORAL 3 TIMES DAILY PRN
Qty: 21 CAPSULE | Refills: 0 | Status: SHIPPED | OUTPATIENT
Start: 2024-10-28 | End: 2024-11-04

## 2024-10-28 RX ORDER — CYCLOSPORINE 0.5 MG/ML
EMULSION OPHTHALMIC
COMMUNITY
Start: 2024-09-12

## 2024-10-28 RX ORDER — DOXYCYCLINE 100 MG/1
CAPSULE ORAL
COMMUNITY
Start: 2024-10-17

## 2024-10-28 RX ORDER — LEVOFLOXACIN 750 MG/1
750 TABLET, FILM COATED ORAL DAILY
Qty: 5 TABLET | Refills: 0 | Status: SHIPPED | OUTPATIENT
Start: 2024-10-28 | End: 2024-11-02

## 2024-10-28 NOTE — PROGRESS NOTES
Annie Gutierrez is a 68 year old female.   Chief Complaint   Patient presents with    Urgent Care F/u     10/15 and 10/17/24 Immediate care visit for URI/Pneumonia on L lung - Pt is doing better but still notices very mild wheezing.     HPI:    Patient seen in urgent 10/15 and 10/17 and dx with pneumonia and was given doxycycline which she completed yesterday. Reports feeling 60% better. Ongoing productive cough with intermittent wheezing heard. Denies fever or worsening of symptoms. Tolerating PO. Covid, flu, strep testing negative. no improvement with albuterol inhaler. Allergies to ceclor.     Allergies:  Allergies[1]   Current Meds:  Current Outpatient Medications   Medication Sig Dispense Refill    albuterol 108 (90 Base) MCG/ACT Inhalation Aero Soln Inhale 1 puff into the lungs every 6 (six) hours as needed.      cycloSPORINE 0.05 % Ophthalmic Emulsion       doxycycline 100 MG Oral Cap Take 1 capsule (100 mg total) by mouth 2 (two) times a day for 10 days. Do not take within 2 hours of dairy, antacids, iron or sucralfate.      escitalopram (LEXAPRO) 5 MG Oral Tab Take 1 tablet (5 mg total) by mouth daily. 90 tablet 0    clotrimazole 1 % External Cream Apply 1 Application topically 2 (two) times daily. 85 g 1    atorvastatin 10 MG Oral Tab Take 1 tablet (10 mg total) by mouth daily. 90 tablet 0    desonide 0.05 % External Cream Apply 1 Tube topically 2 (two) times daily. Apply sparingly and rub gently into the affected area(s). 15 g 5    LISINOPRIL 10 MG Oral Tab TAKE 1 TABLET DAILY 90 tablet 3    Probiotic Product (PROBIOTIC-10 OR) Take by mouth.      Magnesium 300 MG Oral Cap Take by mouth.      Pyridoxine HCl (VITAMIN B-6 OR) Take by mouth.      Menaquinone-7 (VITAMIN K2 OR) Take by mouth.      Aspirin 81 MG Oral Tab Take 1 tablet (81 mg total) by mouth daily.      Cholecalciferol (VITAMIN D) 2000 UNITS Oral Tab Take 1 tablet by mouth daily.          PMH:     Past Medical History:    Acute upper  respiratory infections of unspecified site    Anxiety state, unspecified    Arthritis    Calculus of kidney    Closed fracture of unspecified part of femur    Fatty liver    Frequent UTI    High cholesterol    Morbid obesity (HCC)    Night sweats    Osteoarthritis    Visual impairment    Wears glasses       ROS:   Review of Systems   HENT:  Positive for sore throat.    Respiratory:  Positive for cough, chest tightness and wheezing.    Cardiovascular:  Negative for chest pain, palpitations and leg swelling.   Musculoskeletal: Negative.    Neurological: Negative.             PHYSICAL EXAM:    /74   Pulse 73   Temp 98.2 °F (36.8 °C) (Temporal)   Resp 14   Ht 5' 3\" (1.6 m)   Wt 190 lb (86.2 kg)   LMP  (LMP Unknown)   SpO2 95%   BMI 33.66 kg/m²   Physical Exam  Constitutional:       Appearance: Normal appearance.   HENT:      Mouth/Throat:      Pharynx: Posterior oropharyngeal erythema present.   Pulmonary:      Effort: Pulmonary effort is normal. No respiratory distress.      Breath sounds: Wheezing present.   Chest:      Chest wall: No tenderness.   Skin:     General: Skin is warm and dry.      Capillary Refill: Capillary refill takes less than 2 seconds.   Neurological:      Mental Status: She is alert.        ASSESSMENT/ PLAN:   1. Pneumonia of left lung due to infectious organism, unspecified part of lung  Repeat imaging roughly 4 weeks from previous discussed to confirm resolved infection. Continue albuterol q 4-6 hours PRN.   Levofloxacin sent given recent monotherapy with doxycycline and continued symptoms. Discussed symptom supportive measures over the counter as well. Call with questions or changes. ER warnings discussed.   - XR CHEST PA + LAT CHEST (CPT=71046); Future      Health Maintenance Due   Topic Date Due    Pneumococcal Vaccine: 65+ Years (3 of 3 - PPSV23 or PCV20) 08/24/2024    COVID-19 Vaccine (4 - 2023-24 season) 09/01/2024    Influenza Vaccine (1) 10/01/2024           Pt indicates  understanding and agrees to the plan.     ZARA Braswell            [1]   Allergies  Allergen Reactions    [facility] [place] [facility]

## 2024-10-30 ENCOUNTER — NURSE TRIAGE (OUTPATIENT)
Dept: INTERNAL MEDICINE CLINIC | Facility: CLINIC | Age: 69
End: 2024-10-30

## 2024-10-30 ENCOUNTER — PATIENT MESSAGE (OUTPATIENT)
Dept: INTERNAL MEDICINE CLINIC | Facility: CLINIC | Age: 69
End: 2024-10-30

## 2024-10-30 NOTE — TELEPHONE ENCOUNTER
Action Requested: Summary for Provider     []  Critical Lab, Recommendations Needed  [x] Need Additional Advice  []   FYI    []   Need Orders  [] Need Medications Sent to Pharmacy  []  Other     SUMMARY: Patient reports feeling worse since office visit on Monday.  Continues with productive cough with green mucous, no blood present.    She is on day 3 of her 2nd antibiotic (levofloxacin).  She reports 1 episode today of coughing so hard that she vomited.  She is asking for zpak.    Reason for call: Cough  Onset: several weeks ago, seen on 10/28/24  Patient feels strongly that she needs a z-swapna.  Patient reports improvement with loose stools, taking probiotics that is helping.    She is Temp, 99.5  Reason for Disposition   Patient wants to be seen    Protocols used: Cough-A-OH

## 2024-10-31 RX ORDER — AZITHROMYCIN 250 MG/1
TABLET, FILM COATED ORAL
Qty: 6 TABLET | Refills: 0 | Status: SHIPPED | OUTPATIENT
Start: 2024-10-31 | End: 2024-11-04

## 2024-10-31 NOTE — TELEPHONE ENCOUNTER
Spoke with patient advising the Z swapna was sent into [facility].  Patient verbalizes understanding. Advised to stop levaquin.

## 2024-10-31 NOTE — TELEPHONE ENCOUNTER
We discussed in visit. Medication sent to pharmacy. Have her call with update next week on how she is feeling.      Hope she feels better soon!

## 2024-10-31 NOTE — TELEPHONE ENCOUNTER
Nena - can you please confirm if you want patient to stop levaquin and start Z swapna, or do you want patient to take both, please clarify?

## 2024-10-31 NOTE — TELEPHONE ENCOUNTER
Triage call transferred.   Spoke with pt f/u on BD recommendations and prescription request.  Informed BD seeing pts in clinic today, will send follow-up message. Pt verbalized understanding and agreed with POC.

## 2024-11-06 ENCOUNTER — HOSPITAL ENCOUNTER (OUTPATIENT)
Dept: GENERAL RADIOLOGY | Age: 69
Discharge: HOME | End: 2024-11-06
Payer: MEDICARE

## 2024-11-06 DIAGNOSIS — J18.9 PNEUMONIA OF LEFT LUNG DUE TO INFECTIOUS ORGANISM, UNSPECIFIED PART OF LUNG: ICD-10-CM

## 2024-11-06 PROCEDURE — 71046 X-RAY EXAM CHEST 2 VIEWS: CPT

## 2024-11-08 DIAGNOSIS — E78.00 HIGH CHOLESTEROL: ICD-10-CM

## 2024-11-12 RX ORDER — ATORVASTATIN CALCIUM 10 MG/1
10 TABLET, FILM COATED ORAL DAILY
Qty: 90 TABLET | Refills: 3 | Status: SHIPPED | OUTPATIENT
Start: 2024-11-12

## 2024-11-12 NOTE — TELEPHONE ENCOUNTER
Refill passes per [facility] protocol.    No future appointments.  Last office visit: 10/28/24    Requested Prescriptions   Pending Prescriptions Disp Refills    ATORVASTATIN 10 MG Oral Tab [Pharmacy Med Name: ATORVASTATIN TABS 10MG] 90 tablet 3     Sig: TAKE 1 TABLET DAILY       Cholesterol Medication Protocol Passed - 11/12/2024  3:19 PM        Passed - ALT < 80     Lab Results   Component Value Date    ALT 28 08/15/2024             Passed - ALT resulted within past year        Passed - Lipid panel within past 12 months     Lab Results   Component Value Date    CHOLEST 181 08/15/2024    TRIG 87 08/15/2024    HDL 58 08/15/2024     (H) 08/15/2024    VLDL 15 08/15/2024    TCHDLRATIO 3.22 12/18/2017    NONHDLC 123 08/15/2024             Passed - In person appointment or virtual visit in the past 12 mos or appointment in next 3 mos     Recent Outpatient Visits              2 weeks ago Pneumonia of left lung due to infectious organism, unspecified part of lung    [address]    Office Visit    2 months ago Encounter for annual health examination    [facility], [address] [place], [place] [address]Manuela Palma PA-C    Office Visit    3 months ago ZAKIA Piper Scott, MD    Office Visit    1 year ago     [facility] Rick Fountain, PT    Office Visit    1 year ago     [facility] Rick Fountain, PT    Office Visit                             Recent Outpatient Visits              2 weeks ago Pneumonia of left lung due to infectious organism, unspecified part of lung    [address]    Office Visit    2 months ago Encounter for annual health examination    [address] Manuela Mccallum PA-C    Office Visit    3 months ago Cherry angioma    [facility]  ZAKIA NI Scott, MD    Office Visit    1 year ago     [facility] Rick Fountain, PT    Office Visit    1 year ago     [facility] Rick Fountain, PT    Office Visit

## 2024-11-20 ENCOUNTER — LAB ENCOUNTER (OUTPATIENT)
Dept: LAB | Age: 69
End: 2024-11-20
Attending: PHYSICIAN ASSISTANT
Payer: MEDICARE

## 2024-11-20 DIAGNOSIS — R17 ELEVATED BILIRUBIN: ICD-10-CM

## 2024-11-20 DIAGNOSIS — R79.89 ABNORMAL TSH: ICD-10-CM

## 2024-11-20 LAB
ALBUMIN SERPL-MCNC: 4.2 G/DL (ref 3.2–4.8)
ALP LIVER SERPL-CCNC: 93 U/L
ALT SERPL-CCNC: 31 U/L
AST SERPL-CCNC: 28 U/L (ref ?–34)
BILIRUB DIRECT SERPL-MCNC: 0.4 MG/DL (ref ?–0.3)
BILIRUB SERPL-MCNC: 1.5 MG/DL (ref 0.2–1.1)
PROT SERPL-MCNC: 6.8 G/DL (ref 5.7–8.2)
TSI SER-ACNC: 0.57 UIU/ML (ref 0.55–4.78)

## 2024-11-20 PROCEDURE — 80076 HEPATIC FUNCTION PANEL: CPT

## 2024-11-20 PROCEDURE — 84443 ASSAY THYROID STIM HORMONE: CPT

## 2024-11-20 PROCEDURE — 36415 COLL VENOUS BLD VENIPUNCTURE: CPT

## 2024-12-18 ENCOUNTER — TELEPHONE (OUTPATIENT)
Dept: INTERNAL MEDICINE CLINIC | Facility: CLINIC | Age: 69
End: 2024-12-18

## 2024-12-18 RX ORDER — ESCITALOPRAM OXALATE 5 MG/1
5 TABLET ORAL DAILY
Qty: 30 TABLET | Refills: 0 | Status: SHIPPED | OUTPATIENT
Start: 2024-12-18 | End: 2025-01-17

## 2024-12-18 NOTE — TELEPHONE ENCOUNTER
Spoke to patient to advise a 30 day supply of Lexapro was sent to local pharmacy.  Patient verbalizes understanding.

## 2024-12-18 NOTE — TELEPHONE ENCOUNTER
Patient called to request a 2 week temporary prescription for Lexapro.  She said that the medication was not on auto refill with [facility] and is now delayed. She said its a two week turn around time.   She only has 4 days left.        Sasha - can you refill a 2 week supply of Lexapro to [facility] in [place]?

## 2024-12-24 RX ORDER — ESCITALOPRAM OXALATE 5 MG/1
5 TABLET ORAL DAILY
Qty: 90 TABLET | Refills: 3 | OUTPATIENT
Start: 2024-12-24

## 2025-01-10 RX ORDER — ESCITALOPRAM OXALATE 5 MG/1
5 TABLET ORAL DAILY
Qty: 90 TABLET | Refills: 0 | Status: SHIPPED | OUTPATIENT
Start: 2025-01-10

## 2025-01-10 NOTE — TELEPHONE ENCOUNTER
A refill request was received for:  Requested Prescriptions     Pending Prescriptions Disp Refills    escitalopram (LEXAPRO) 5 MG Oral Tab 90 tablet 0     Sig: Take 1 tablet (5 mg total) by mouth daily.     Last refill date: 09/17/2024 Quantity and refills: 90tabs 0 refills  Last office visit related to med: 08/22/2024  When is follow up due: n/a  No future appointments.    Date of lab work related to med: 11/20/2024  When are labs due for repeat: n/a    Please approve or deny

## 2025-03-14 RX ORDER — LISINOPRIL 10 MG/1
10 TABLET ORAL DAILY
Qty: 90 TABLET | Refills: 1 | Status: SHIPPED | OUTPATIENT
Start: 2025-03-14

## 2025-03-14 NOTE — TELEPHONE ENCOUNTER
Refill passed per Clinic protocol.    Has always been signed by Jeannette Crowe PA-C- please advise if refill is appropriate.     Requested Prescriptions   Pending Prescriptions Disp Refills    LISINOPRIL 10 MG Oral Tab [Pharmacy Med Name: LISINOPRIL TABS 10MG] 90 tablet 3     Sig: TAKE 1 TABLET DAILY       Hypertension Medications Protocol Passed - 3/14/2025 12:00 PM        Passed - CMP or BMP in past 12 months        Passed - Last BP reading less than 140/90     BP Readings from Last 1 Encounters:   10/28/24 128/74               Passed - In person appointment or virtual visit in the past 12 mos or appointment in next 3 mos     Recent Outpatient Visits              4 months ago Pneumonia of left lung due to infectious organism, unspecified part of lung    [facility], [address] [place] [address], [place] APRN    Office Visit    6 months ago Encounter for annual health examination    [facility], [address] [place], [place] [address]Manuela Lay PA-C    Office Visit    7 months ago Cherry angioma    [facility] Carlos A Cunningham MD    Office Visit    1 year ago     [facility] Rick Fountain, PT    Office Visit    1 year ago     [facility] Rick Fountain, PT    Office Visit                      Passed - EGFRCR or GFRNAA > 50     GFR Evaluation  EGFRCR: 82 , resulted on 8/15/2024          Passed - Medication is active on med list

## 2025-03-21 RX ORDER — ESCITALOPRAM OXALATE 5 MG/1
5 TABLET ORAL DAILY
Qty: 90 TABLET | Refills: 3 | Status: SHIPPED | OUTPATIENT
Start: 2025-03-21

## 2025-06-13 ENCOUNTER — OFFICE VISIT (OUTPATIENT)
Dept: INTERNAL MEDICINE CLINIC | Facility: CLINIC | Age: 70
End: 2025-06-13
Payer: MEDICARE

## 2025-06-13 VITALS
DIASTOLIC BLOOD PRESSURE: 62 MMHG | WEIGHT: 194 LBS | SYSTOLIC BLOOD PRESSURE: 118 MMHG | HEART RATE: 64 BPM | HEIGHT: 63 IN | BODY MASS INDEX: 34.37 KG/M2 | TEMPERATURE: 98 F

## 2025-06-13 DIAGNOSIS — F41.9 ANXIETY: ICD-10-CM

## 2025-06-13 DIAGNOSIS — R23.2 HOT FLASHES: Primary | ICD-10-CM

## 2025-06-13 DIAGNOSIS — N95.2 ATROPHIC VAGINITIS: ICD-10-CM

## 2025-06-13 PROBLEM — H04.129 TEAR FILM INSUFFICIENCY: Status: ACTIVE | Noted: 2025-06-13

## 2025-06-13 PROCEDURE — G2211 COMPLEX E/M VISIT ADD ON: HCPCS | Performed by: PHYSICIAN ASSISTANT

## 2025-06-13 PROCEDURE — 99214 OFFICE O/P EST MOD 30 MIN: CPT | Performed by: PHYSICIAN ASSISTANT

## 2025-06-13 RX ORDER — PAROXETINE 10 MG/1
10 TABLET, FILM COATED ORAL EVERY MORNING
Qty: 90 TABLET | Refills: 0 | Status: SHIPPED | OUTPATIENT
Start: 2025-06-13

## 2025-06-13 RX ORDER — ESTRADIOL 0.03 MG/D
1 FILM, EXTENDED RELEASE TRANSDERMAL
COMMUNITY
Start: 2025-05-22 | End: 2025-06-13

## 2025-06-13 RX ORDER — PROGESTERONE 100 MG/1
100 CAPSULE ORAL NIGHTLY
COMMUNITY
Start: 2025-05-22 | End: 2025-06-13

## 2025-06-13 RX ORDER — ESTRADIOL 0.1 MG/G
CREAM VAGINAL
Qty: 42.5 G | Refills: 1 | Status: SHIPPED | OUTPATIENT
Start: 2025-06-13

## 2025-06-13 NOTE — PROGRESS NOTES
The following individual(s) verbally consented to be recorded using ambient AI listening technology and understand that they can each withdraw their consent to this listening technology at any point by asking the clinician to turn off or pause the recording:    Patient name: Annie Gutierrez  Additional names:  None          Chief Complaint   Patient presents with    Medication Request     Js room 13 . Pt requesting estradiol patch and progesterone        History of Present Illness  Wanda Gutierrez is a 69 year old female who presents with persistent hot flashes and sweating, actually worsening over the years instead of improving.    She has been experiencing persistent hot flashes and excessive sweating since menopause. These episodes occur even in air-conditioned environments and during nighttime, leading to significant discomfort and impacting her social life. She describes feeling 'like I've taken a shower' during events, which has led her to avoid certain social situations.    She has been using a low-dose estrogen patch for the past three weeks, which was recommended by her daughter-in-law. While she notes some improvement in ear itching, she reports initial mild nausea (resolved now)  and aching in her R lower pelvis as side effects. She has two patches left and is considering discontinuing them.    Her current medication regimen includes Lexapro 5 mg for anxiety, which has not alleviated her hot flashes. She dislikes certain medications due to concerns about over-prescription and difficulty discontinuing them.    She has a history of using estrogen cream in the past for vaginal dryness and irritation, which she found beneficial. She is not currently using it but is considering resuming its use.    No trouble sleeping. No significant improvement in hot flashes with current treatment.      Review of Systems   See HPI.  No other complaints today.    Past Medical History[1]    Problem List[2]    Current  Medications[3]    Physical Exam  /62 (BP Location: Right arm, Patient Position: Sitting)   Pulse 64   Temp 97.8 °F (36.6 °C) (Tympanic)   Ht 5' 3\" (1.6 m)   Wt 194 lb (88 kg)   LMP  (LMP Unknown)   BMI 34.37 kg/m²   Constitutional:  No distress.   HEENT:  Normocephalic and atraumatic.  Skin: Skin is warm and dry. No rash noted. No erythema. No pallor.       Assessment & Plan  Hot flashes and sweating  Persistent menopausal hot flashes and sweating affecting quality of life. Estrogen patch caused SEs and I discussed increased cancer risk with estrogen. Consider non-estrogen alternatives like Effexor, Paxil, Prozac, Gabapentin, and oxybutynin. Veozah also a potential treatment.  - Pt and I decided together to switch from Lexapro to Prozac, starting at 10 mg daily.  - Send [employer] message update in 2-3 weeks regarding symptom improvement.  - Consider increasing Prozac dose if symptoms persist.    Atrophic vaginitis  Has used in the past and was helpful.  - Prescribe vaginal estrogen cream for vaginal dryness and irritation.    Anxiety  Managed with Lexapro 5 mg daily for anxiety. Current regimen does not address hot flashes. Discussed switching to Paroxetine for dual benefit.  - Switch from Lexapro to Paroxetine, starting at 10 mg daily.  - Monitor for side effects and efficacy in managing both depression and hot flashes.  - Send MyChart message update in 2-3 weeks regarding symptom improvement.      Code selection for this visit was based on time spent (32 min) on date of service in preparing to see the patient, obtaining and/or reviewing separately obtained history, performing a medically appropriate examination, counseling and educating the patient/family/caregiver, ordering medications or testing, referring and communicating with other healthcare providers, documenting clinical information in the EHR, independently interpreting results and communicating results to the patient/family/caregiver and care  coordination with the patient's other providers.      No orders of the defined types were placed in this encounter.      Meds & Refills for this Visit:  Requested Prescriptions     Signed Prescriptions Disp Refills    PARoxetine 10 MG Oral Tab 90 tablet 0     Sig: Take 1 tablet (10 mg total) by mouth every morning.    estradiol 0.1 MG/GM Vaginal Cream 42.5 g 1     Si g PV nightly for 2 weeks then decrease to 1 g PV three times a week.       Imaging & Consults:  None    Return in about 2 months (around 2025) for Annual physical.  Patient Instructions   Veozah (new medication for hot flashes)    All questions were answered and the patient understands the plan.          [1]   Past Medical History:   Acute upper respiratory infections of unspecified site    Anxiety state, unspecified    Arthritis    Calculus of kidney    Closed fracture of unspecified part of femur    Fatty liver    Frequent UTI    High cholesterol    Morbid obesity (HCC)    Night sweats    Osteoarthritis    Visual impairment    Wears glasses   [2]   Patient Active Problem List  Diagnosis    High cholesterol    Vitamin D deficiency    Gallstones    Vaginal atrophy    Calculus of both kidneys    Lumbar radiculopathy    HNP (herniated nucleus pulposus), lumbar    Pre-diabetes    Vitamin B12 deficiency    Class 2 obesity due to excess calories without serious comorbidity with body mass index (BMI) of 35.0 to 35.9 in adult    Family history of colon cancer in father    Carpal tunnel syndrome    History of adenomatous polyp of colon    Diverticulosis    Internal hemorrhoids    Primary hypertension    Disturbance of skin sensation    Tear film insufficiency   [3]   Current Outpatient Medications   Medication Sig Dispense Refill    PARoxetine 10 MG Oral Tab Take 1 tablet (10 mg total) by mouth every morning. 90 tablet 0    estradiol 0.1 MG/GM Vaginal Cream 1 g PV nightly for 2 weeks then decrease to 1 g PV three times a week. 42.5 g 1    lisinopril 10  MG Oral Tab Take 1 tablet (10 mg total) by mouth daily. 90 tablet 1    atorvastatin 10 MG Oral Tab Take 1 tablet (10 mg total) by mouth daily. 90 tablet 3    cycloSPORINE 0.05 % Ophthalmic Emulsion       clotrimazole 1 % External Cream Apply 1 Application topically 2 (two) times daily. 85 g 1    desonide 0.05 % External Cream Apply 1 Tube topically 2 (two) times daily. Apply sparingly and rub gently into the affected area(s). 15 g 5    Probiotic Product (PROBIOTIC-10 OR) Take by mouth.      Magnesium 300 MG Oral Cap Take by mouth.      Pyridoxine HCl (VITAMIN B-6 OR) Take by mouth.      Menaquinone-7 (VITAMIN K2 OR) Take by mouth.      Aspirin 81 MG Oral Tab Take 1 tablet (81 mg total) by mouth daily.      Cholecalciferol (VITAMIN D) 2000 UNITS Oral Tab Take 1 tablet by mouth daily.

## 2025-06-25 ENCOUNTER — TELEPHONE (OUTPATIENT)
Dept: INTERNAL MEDICINE CLINIC | Facility: CLINIC | Age: 70
End: 2025-06-25

## 2025-06-25 ENCOUNTER — TELEMEDICINE (OUTPATIENT)
Dept: INTERNAL MEDICINE CLINIC | Facility: CLINIC | Age: 70
End: 2025-06-25
Payer: MEDICARE

## 2025-06-25 DIAGNOSIS — Z79.899 MEDICATION MANAGEMENT: Primary | ICD-10-CM

## 2025-06-25 DIAGNOSIS — R23.2 HOT FLASHES: ICD-10-CM

## 2025-06-25 PROCEDURE — G2211 COMPLEX E/M VISIT ADD ON: HCPCS | Performed by: PHYSICIAN ASSISTANT

## 2025-06-25 PROCEDURE — 99213 OFFICE O/P EST LOW 20 MIN: CPT | Performed by: PHYSICIAN ASSISTANT

## 2025-06-25 RX ORDER — FEZOLINETANT 45 MG/1
1 TABLET, FILM COATED ORAL DAILY
Qty: 90 TABLET | Refills: 1 | Status: SHIPPED | OUTPATIENT
Start: 2025-06-25

## 2025-06-25 NOTE — PROGRESS NOTES
The following individual(s) verbally consented to be recorded using ambient AI listening technology and understand that they can each withdraw their consent to this listening technology at any point by asking the clinician to turn off or pause the recording:    Patient name: Annie Gutierrez  Additional names:  None        Virtual visit conducted using 2 way audio and video.  Visit length:  11 min    History of Present Illness  Wanda Gutierrez is a 69 year old female who presents with bothersome hot flashes.    She experiences bothersome hot flashes and is considering medication options to address this issue. Previously, she was on Lexapro, which she found effective for other concerns but did not alleviate her hot flashes.  See prior note for details.      She has been on Lexapro at a dose of 5 mg, which she feels she may no longer need as her stress levels have decreased. She is considering weaning off Lexapro and is open to reducing the dose to 2.5 mg daily for a couple of weeks before stopping.    She has not started Paxil due to concerns about her family history of mental illness, specifically her father's diagnosis of bipolar disorder.  She has done her own research on the options and would like to try Veozah.          Review of Systems   No f/c/chest pain or sob. No cough. No n/v/d. No ha or dizziness. No other complaints today.    Past Medical History[1]    Problem List[2]    Current Medications[3]    Physical Exam  LMP  (LMP Unknown)   Constitutional:  No distress.   Lungs:  Pt is able to speak in complete sentences without difficulty.    Assessment & Plan  Persistent menopausal hot flashes  Hot flashes persist. She prefers Veozah due to family history of bipolar disorder and its hormone-free nature.  - Prescribe Veozah via [facility].  - Order liver function tests before starting Veozah and at 3, 6, and 9 months after initiation.  - Monitor for side effects including headaches, stomach upset,  diarrhea, insomnia, back pain, and liver issues.  - Advise discontinuation of Veozah if serious side effects occur.    Anxiety state, unspecified  Anxiety better controlled. She is interested in weaning off Lexapro.  - Reduce Lexapro to 2.5 mg daily for a few weeks, then discontinue.  - Monitor for return of anxiety symptoms and resume Lexapro if necessary.    Recording duration: 11 minutes        Orders Placed This Encounter   Procedures    Hepatic Function Panel (7) [E]       Meds & Refills for this Visit:  Requested Prescriptions     Signed Prescriptions Disp Refills    Fezolinetant (VEOZAH) 45 MG Oral Tab 90 tablet 1     Sig: Take 1 tablet by mouth daily.       Imaging & Consults:  None    No follow-ups on file.  There are no Patient Instructions on file for this visit.    All questions were answered and the patient understands the plan.        [1]   Past Medical History:   Acute upper respiratory infections of unspecified site    Anxiety state, unspecified    Arthritis    Calculus of kidney    Closed fracture of unspecified part of femur    Fatty liver    Frequent UTI    High cholesterol    Morbid obesity (HCC)    Night sweats    Osteoarthritis    Visual impairment    Wears glasses   [2]   Patient Active Problem List  Diagnosis    High cholesterol    Vitamin D deficiency    Gallstones    Vaginal atrophy    Calculus of both kidneys    Lumbar radiculopathy    HNP (herniated nucleus pulposus), lumbar    Pre-diabetes    Vitamin B12 deficiency    Class 2 obesity due to excess calories without serious comorbidity with body mass index (BMI) of 35.0 to 35.9 in adult    Family history of colon cancer in father    Carpal tunnel syndrome    History of adenomatous polyp of colon    Diverticulosis    Internal hemorrhoids    Primary hypertension    Disturbance of skin sensation    Tear film insufficiency   [3]   Current Outpatient Medications   Medication Sig Dispense Refill    Fezolinetant (VEOZAH) 45 MG Oral Tab Take 1  tablet by mouth daily. 90 tablet 1    metroNIDAZOLE 0.75 % External Cream Apply to affected areas twice daily 45 g 3    PARoxetine 10 MG Oral Tab Take 1 tablet (10 mg total) by mouth every morning. 90 tablet 0    estradiol 0.1 MG/GM Vaginal Cream 1 g PV nightly for 2 weeks then decrease to 1 g PV three times a week. 42.5 g 1    lisinopril 10 MG Oral Tab Take 1 tablet (10 mg total) by mouth daily. 90 tablet 1    atorvastatin 10 MG Oral Tab Take 1 tablet (10 mg total) by mouth daily. 90 tablet 3    cycloSPORINE 0.05 % Ophthalmic Emulsion       clotrimazole 1 % External Cream Apply 1 Application topically 2 (two) times daily. 85 g 1    desonide 0.05 % External Cream Apply 1 Tube topically 2 (two) times daily. Apply sparingly and rub gently into the affected area(s). 15 g 5    Probiotic Product (PROBIOTIC-10 OR) Take by mouth.      Magnesium 300 MG Oral Cap Take by mouth.      Pyridoxine HCl (VITAMIN B-6 OR) Take by mouth.      Menaquinone-7 (VITAMIN K2 OR) Take by mouth.      Aspirin 81 MG Oral Tab Take 1 tablet (81 mg total) by mouth daily.      Cholecalciferol (VITAMIN D) 2000 UNITS Oral Tab Take 1 tablet by mouth daily.

## 2025-06-25 NOTE — TELEPHONE ENCOUNTER
Does the patient have a contraindication to menopausal hormone therapy (estrogens with or without progestins)?  Yes  No    Dear Manuela Joya,    Please address the above question to complete prior authorization.    Please respond to the [facility] Refills.    Thank You ,  Bárbara Olson

## 2025-06-26 NOTE — TELEPHONE ENCOUNTER
Veozah Denied    Note from payer: CaseId:[id number];Status:Denied;Review Type:Prior Auth;Appeal Information: Attention:ATTN:  CLINICAL APPEALS DEPARTMENT [facility] [address] [address],[place], [place],[address] Phone:[phone] Fax:[phone]; Important - Please read the below note on eAppeals: Please reference the denial letter for information on the rights for an appeal, rationale for the denial, and how to submit an appeal including if any information is needed to support the appeal. Note about urgent situations - Generally, an urgent situation is one which, in the opinion of the provider, the health of the patient may be in serious jeopardy or may experience pain that cannot be adequately controlled while waiting for a decision on the appeal.;  Payer: [facility] [employer] Case ID: [id number]    [phone]  Electronic appeal: Not supported        Awaiting denial letter via fax.

## 2025-06-27 NOTE — TELEPHONE ENCOUNTER
Please let pt know that Veozah was denied by her ins.  Is there something else she would like to try?  We previously discussed options.

## 2025-08-04 ENCOUNTER — TELEPHONE (OUTPATIENT)
Dept: INTERNAL MEDICINE CLINIC | Facility: CLINIC | Age: 70
End: 2025-08-04

## 2025-08-04 DIAGNOSIS — R73.03 PRE-DIABETES: ICD-10-CM

## 2025-08-04 DIAGNOSIS — R79.89 ABNORMAL TSH: ICD-10-CM

## 2025-08-04 DIAGNOSIS — I10 PRIMARY HYPERTENSION: ICD-10-CM

## 2025-08-04 DIAGNOSIS — E78.00 HIGH CHOLESTEROL: ICD-10-CM

## 2025-08-04 DIAGNOSIS — E53.8 VITAMIN B12 DEFICIENCY: ICD-10-CM

## 2025-08-04 DIAGNOSIS — E55.9 VITAMIN D DEFICIENCY: ICD-10-CM

## 2025-08-04 DIAGNOSIS — Z00.00 ROUTINE GENERAL MEDICAL EXAMINATION AT A HEALTH CARE FACILITY: Primary | ICD-10-CM

## 2025-08-22 ENCOUNTER — LABORATORY ENCOUNTER (OUTPATIENT)
Dept: LAB | Age: 70
End: 2025-08-22
Attending: PHYSICIAN ASSISTANT

## 2025-08-22 DIAGNOSIS — E53.8 VITAMIN B12 DEFICIENCY: ICD-10-CM

## 2025-08-22 DIAGNOSIS — R73.03 PRE-DIABETES: ICD-10-CM

## 2025-08-22 DIAGNOSIS — Z00.00 ROUTINE GENERAL MEDICAL EXAMINATION AT A HEALTH CARE FACILITY: ICD-10-CM

## 2025-08-22 DIAGNOSIS — E78.00 HIGH CHOLESTEROL: ICD-10-CM

## 2025-08-22 DIAGNOSIS — I10 PRIMARY HYPERTENSION: ICD-10-CM

## 2025-08-22 DIAGNOSIS — R79.89 ABNORMAL TSH: ICD-10-CM

## 2025-08-22 DIAGNOSIS — E55.9 VITAMIN D DEFICIENCY: ICD-10-CM

## 2025-08-22 DIAGNOSIS — Z79.899 MEDICATION MANAGEMENT: ICD-10-CM

## 2025-08-22 LAB
ALBUMIN SERPL-MCNC: 4.4 G/DL (ref 3.2–4.8)
ALBUMIN/GLOB SERPL: 1.9 (ref 1–2)
ALP LIVER SERPL-CCNC: 90 U/L (ref 55–142)
ALT SERPL-CCNC: 18 U/L (ref 10–49)
ANION GAP SERPL CALC-SCNC: 10 MMOL/L (ref 0–18)
AST SERPL-CCNC: 20 U/L (ref ?–34)
BASOPHILS # BLD AUTO: 0.03 X10(3) UL (ref 0–0.2)
BASOPHILS NFR BLD AUTO: 0.5 %
BILIRUB DIRECT SERPL-MCNC: 0.3 MG/DL (ref ?–0.3)
BILIRUB SERPL-MCNC: 1.2 MG/DL (ref 0.2–1.1)
BUN BLD-MCNC: 11 MG/DL (ref 9–23)
CALCIUM BLD-MCNC: 9.5 MG/DL (ref 8.7–10.6)
CHLORIDE SERPL-SCNC: 106 MMOL/L (ref 98–112)
CHOLEST SERPL-MCNC: 190 MG/DL (ref ?–200)
CO2 SERPL-SCNC: 27 MMOL/L (ref 21–32)
CREAT BLD-MCNC: 0.72 MG/DL (ref 0.55–1.02)
EGFRCR SERPLBLD CKD-EPI 2021: 91 ML/MIN/1.73M2 (ref 60–?)
EOSINOPHIL # BLD AUTO: 0.19 X10(3) UL (ref 0–0.7)
EOSINOPHIL NFR BLD AUTO: 3.5 %
ERYTHROCYTE [DISTWIDTH] IN BLOOD BY AUTOMATED COUNT: 12.3 %
FASTING PATIENT LIPID ANSWER: YES
FASTING STATUS PATIENT QL REPORTED: YES
GLOBULIN PLAS-MCNC: 2.3 G/DL (ref 2–3.5)
GLUCOSE BLD-MCNC: 86 MG/DL (ref 70–99)
HCT VFR BLD AUTO: 41.2 % (ref 35–48)
HDLC SERPL-MCNC: 66 MG/DL (ref 40–59)
HGB BLD-MCNC: 13.8 G/DL (ref 12–16)
IMM GRANULOCYTES # BLD AUTO: 0.01 X10(3) UL (ref 0–1)
IMM GRANULOCYTES NFR BLD: 0.2 %
LDLC SERPL CALC-MCNC: 112 MG/DL (ref ?–100)
LYMPHOCYTES # BLD AUTO: 1.76 X10(3) UL (ref 1–4)
LYMPHOCYTES NFR BLD AUTO: 32.2 %
MCH RBC QN AUTO: 31.7 PG (ref 26–34)
MCHC RBC AUTO-ENTMCNC: 33.5 G/DL (ref 31–37)
MCV RBC AUTO: 94.5 FL (ref 80–100)
MONOCYTES # BLD AUTO: 0.45 X10(3) UL (ref 0.1–1)
MONOCYTES NFR BLD AUTO: 8.2 %
NEUTROPHILS # BLD AUTO: 3.03 X10 (3) UL (ref 1.5–7.7)
NEUTROPHILS # BLD AUTO: 3.03 X10(3) UL (ref 1.5–7.7)
NEUTROPHILS NFR BLD AUTO: 55.4 %
NONHDLC SERPL-MCNC: 124 MG/DL (ref ?–130)
OSMOLALITY SERPL CALC.SUM OF ELEC: 295 MOSM/KG (ref 275–295)
PLATELET # BLD AUTO: 306 10(3)UL (ref 150–450)
POTASSIUM SERPL-SCNC: 4.4 MMOL/L (ref 3.5–5.1)
PROT SERPL-MCNC: 6.7 G/DL (ref 5.7–8.2)
RBC # BLD AUTO: 4.36 X10(6)UL (ref 3.8–5.3)
SODIUM SERPL-SCNC: 143 MMOL/L (ref 136–145)
TRIGL SERPL-MCNC: 67 MG/DL (ref 30–149)
TSI SER-ACNC: 0.76 UIU/ML (ref 0.55–4.78)
VIT B12 SERPL-MCNC: 221 PG/ML (ref 211–911)
VIT D+METAB SERPL-MCNC: 91.6 NG/ML (ref 30–100)
VLDLC SERPL CALC-MCNC: 11 MG/DL (ref 0–30)
WBC # BLD AUTO: 5.5 X10(3) UL (ref 4–11)

## 2025-08-22 PROCEDURE — 85025 COMPLETE CBC W/AUTO DIFF WBC: CPT

## 2025-08-22 PROCEDURE — 82248 BILIRUBIN DIRECT: CPT

## 2025-08-22 PROCEDURE — 84443 ASSAY THYROID STIM HORMONE: CPT

## 2025-08-22 PROCEDURE — 82607 VITAMIN B-12: CPT

## 2025-08-22 PROCEDURE — 80053 COMPREHEN METABOLIC PANEL: CPT

## 2025-08-22 PROCEDURE — 36415 COLL VENOUS BLD VENIPUNCTURE: CPT

## 2025-08-22 PROCEDURE — 80061 LIPID PANEL: CPT

## 2025-08-22 PROCEDURE — 82306 VITAMIN D 25 HYDROXY: CPT

## 2025-08-26 PROBLEM — R23.2 HOT FLASHES: Status: ACTIVE | Noted: 2025-08-26

## 2025-08-27 ENCOUNTER — OFFICE VISIT (OUTPATIENT)
Dept: INTERNAL MEDICINE CLINIC | Facility: CLINIC | Age: 70
End: 2025-08-27

## 2025-08-27 VITALS
DIASTOLIC BLOOD PRESSURE: 66 MMHG | HEIGHT: 63 IN | SYSTOLIC BLOOD PRESSURE: 110 MMHG | HEART RATE: 71 BPM | BODY MASS INDEX: 34.02 KG/M2 | OXYGEN SATURATION: 93 % | RESPIRATION RATE: 16 BRPM | WEIGHT: 192 LBS | TEMPERATURE: 97 F

## 2025-08-27 DIAGNOSIS — R23.2 HOT FLASHES: ICD-10-CM

## 2025-08-27 DIAGNOSIS — E66.812 CLASS 2 OBESITY DUE TO EXCESS CALORIES WITHOUT SERIOUS COMORBIDITY WITH BODY MASS INDEX (BMI) OF 35.0 TO 35.9 IN ADULT: ICD-10-CM

## 2025-08-27 DIAGNOSIS — E66.09 CLASS 2 OBESITY DUE TO EXCESS CALORIES WITHOUT SERIOUS COMORBIDITY WITH BODY MASS INDEX (BMI) OF 35.0 TO 35.9 IN ADULT: ICD-10-CM

## 2025-08-27 DIAGNOSIS — R73.03 PRE-DIABETES: ICD-10-CM

## 2025-08-27 DIAGNOSIS — K64.8 INTERNAL HEMORRHOIDS: ICD-10-CM

## 2025-08-27 DIAGNOSIS — E78.00 HIGH CHOLESTEROL: ICD-10-CM

## 2025-08-27 DIAGNOSIS — K57.30 DIVERTICULOSIS OF COLON: ICD-10-CM

## 2025-08-27 DIAGNOSIS — Z12.31 VISIT FOR SCREENING MAMMOGRAM: ICD-10-CM

## 2025-08-27 DIAGNOSIS — I10 PRIMARY HYPERTENSION: ICD-10-CM

## 2025-08-27 DIAGNOSIS — H04.123 INSUFFICIENCY OF TEAR FILM OF BOTH EYES: ICD-10-CM

## 2025-08-27 DIAGNOSIS — N39.3 URINARY, INCONTINENCE, STRESS FEMALE: ICD-10-CM

## 2025-08-27 DIAGNOSIS — E53.8 VITAMIN B12 DEFICIENCY: ICD-10-CM

## 2025-08-27 DIAGNOSIS — Z86.0101 HISTORY OF ADENOMATOUS POLYP OF COLON: ICD-10-CM

## 2025-08-27 DIAGNOSIS — G56.03 BILATERAL CARPAL TUNNEL SYNDROME: ICD-10-CM

## 2025-08-27 DIAGNOSIS — Z00.00 ENCOUNTER FOR ANNUAL HEALTH EXAMINATION: Primary | ICD-10-CM

## 2025-08-27 DIAGNOSIS — Z87.442 HISTORY OF KIDNEY STONES: ICD-10-CM

## 2025-08-27 DIAGNOSIS — M51.26 HNP (HERNIATED NUCLEUS PULPOSUS), LUMBAR: ICD-10-CM

## 2025-08-27 DIAGNOSIS — Z80.0 FAMILY HISTORY OF COLON CANCER IN FATHER: ICD-10-CM

## 2025-08-27 DIAGNOSIS — E55.9 VITAMIN D DEFICIENCY: ICD-10-CM

## 2025-08-27 DIAGNOSIS — N95.2 VAGINAL ATROPHY: ICD-10-CM

## 2025-08-27 PROCEDURE — G0439 PPPS, SUBSEQ VISIT: HCPCS | Performed by: PHYSICIAN ASSISTANT

## 2025-08-27 PROCEDURE — 99213 OFFICE O/P EST LOW 20 MIN: CPT | Performed by: PHYSICIAN ASSISTANT

## 2025-08-27 PROCEDURE — 99499 UNLISTED E&M SERVICE: CPT | Performed by: PHYSICIAN ASSISTANT

## 2025-08-27 RX ORDER — ESCITALOPRAM OXALATE 5 MG/1
5 TABLET ORAL DAILY
COMMUNITY

## (undated) DEVICE — CYSTO CDS-LF: Brand: MEDLINE INDUSTRIES, INC.

## (undated) DEVICE — 2.4F TIPLESS NITINOL BASKET

## (undated) DEVICE — OPEN-END FLEXI-TIP URETERAL CATHETER: Brand: FLEXI-TIP

## (undated) DEVICE — 273 SINGLE USE LASER FIBER

## (undated) DEVICE — SOL  .9 3000ML

## (undated) DEVICE — Device

## (undated) DEVICE — GLOVE SURG SENSICARE SZ 7-1/2

## (undated) DEVICE — KENDALL SCD EXPRESS SLEEVES, KNEE LENGTH, MEDIUM: Brand: KENDALL SCD

## (undated) DEVICE — SPECIMEN CONTAINER,POSITIVE SEAL INDICATOR, OR PACKAGED: Brand: PRECISION

## (undated) DEVICE — PROXIS URETERAL ACCESS SHEATH

## (undated) DEVICE — SEAL Y BIOPSY PORT P6R SCOPE

## (undated) DEVICE — ZIPWIRE GUIDEWIRE .038X150 STR

## (undated) DEVICE — SOL  .9 1000ML BTL

## (undated) NOTE — LETTER
03/09/21        [address] [place] [address]      Dear Lisa Hess records indicate that you have outstanding lab work and or testing that was ordered for you and has not yet been completed:  Orders Placed This Encounter

## (undated) NOTE — Clinical Note
Thank you for referring Lexy Bonds to the [facility]. I met with her in consultation today. I have ordered labs, set up a nutrition consultation with our dietician.   She was started on Metformin ER for medication therapy and will follow-up

## (undated) NOTE — LETTER
11/01/21        [address] [place] [address]      Dear Vito Scanlon records indicate that you have outstanding lab work and or testing that was ordered for you and has not yet been completed:  Orders Placed This Encounter

## (undated) NOTE — LETTER
[facility]  Christophe Jamesnavdeep 62 [address] [facility], [address] [place] [address]    Consent for Operation    Date: __________________    Time: _______________    1.  I authorize the performance upon Ivan Welch the following operation:    Procedure(s):  CYSTOSCOPY URETER revealed by the pictures or by descriptive texts accompanying them. If the procedure has been videotaped, the surgeon will obtain the original videotape. The hospital will not be responsible for storage or maintenance of this tape.     6. For the purpose of THAT MY DOCTOR PROVIDED ME WITH THE ABOVE EXPLANATIONS, THAT ALL BLANKS OR STATEMENTS REQUIRING INSERTION OR COMPLETION WERE FILLED IN.     Signature of Patient:   ___________________________    When the patient is a minor or mentally incompetent to give co · All of the medicines I take (including prescriptions, herbal supplements, and pills I can buy without a prescription (including street drugs/illegal medications).  Failure to inform my anesthesiologist about these medicines may increase my risk of anesthe _____________________________________________________________________________  Anesthesiologist Signature     Date   Time  I have discussed the procedure and information above with the patient (or patient’s representative) and answered their questions.  The

## (undated) NOTE — Clinical Note
Patient was seen for their 1 month f/u at [facility] with a total weight loss of 11# since initial consult.

## (undated) NOTE — LETTER
[facility], [place], [address]  [place] [address]  [facility]: [phone]  FAX: [phone]        06/20/22      Serenity Akshat  [address] [place] [address]       My office staff  have made several attempts to contact you at my request.  It is in my best judgment for your health and well-being that you contact my office for the following reason(s):    ___ Schedule office visit for a Follow Up     _x__ Schedule Annual Physical        ___      Other:        I believe that the relationship between a physician and their patient is one of communication and mutual cooperation. It is important for the patient to follow through with the recommendations made by their Doctor in order to achieve the best possible outcome. If there are any problems or concerns that I am not aware of , please call the office so that we may resolve any issues and proceed with your care.       Sincerely,    The office of Albaro Morillo